# Patient Record
Sex: FEMALE | Race: BLACK OR AFRICAN AMERICAN | NOT HISPANIC OR LATINO
[De-identification: names, ages, dates, MRNs, and addresses within clinical notes are randomized per-mention and may not be internally consistent; named-entity substitution may affect disease eponyms.]

---

## 2018-05-30 PROBLEM — Z00.00 ENCOUNTER FOR PREVENTIVE HEALTH EXAMINATION: Status: ACTIVE | Noted: 2018-05-30

## 2018-06-05 ENCOUNTER — APPOINTMENT (OUTPATIENT)
Dept: OBGYN | Facility: CLINIC | Age: 23
End: 2018-06-05
Payer: COMMERCIAL

## 2018-06-05 VITALS
HEIGHT: 67 IN | SYSTOLIC BLOOD PRESSURE: 110 MMHG | BODY MASS INDEX: 24.33 KG/M2 | WEIGHT: 155 LBS | DIASTOLIC BLOOD PRESSURE: 80 MMHG

## 2018-06-05 DIAGNOSIS — Z78.9 OTHER SPECIFIED HEALTH STATUS: ICD-10-CM

## 2018-06-05 DIAGNOSIS — Z82.49 FAMILY HISTORY OF ISCHEMIC HEART DISEASE AND OTHER DISEASES OF THE CIRCULATORY SYSTEM: ICD-10-CM

## 2018-06-05 PROCEDURE — 99203 OFFICE O/P NEW LOW 30 MIN: CPT

## 2018-06-06 PROBLEM — Z82.49 FAMILY HISTORY OF HYPERTENSION: Status: ACTIVE | Noted: 2018-06-06

## 2018-06-06 PROBLEM — Z78.9 DOES NOT USE ILLICIT DRUGS: Status: ACTIVE | Noted: 2018-06-06

## 2018-06-06 PROBLEM — Z78.9 NON-SMOKER: Status: ACTIVE | Noted: 2018-06-06

## 2018-06-06 PROBLEM — Z78.9 NO HISTORY OF ALCOHOL USE: Status: ACTIVE | Noted: 2018-06-06

## 2018-06-26 ENCOUNTER — APPOINTMENT (OUTPATIENT)
Dept: OBGYN | Facility: CLINIC | Age: 23
End: 2018-06-26
Payer: COMMERCIAL

## 2018-06-26 VITALS
SYSTOLIC BLOOD PRESSURE: 112 MMHG | WEIGHT: 160 LBS | BODY MASS INDEX: 25.11 KG/M2 | DIASTOLIC BLOOD PRESSURE: 56 MMHG | HEIGHT: 67 IN

## 2018-06-26 DIAGNOSIS — N83.209 UNSPECIFIED OVARIAN CYST, UNSPECIFIED SIDE: ICD-10-CM

## 2018-06-26 DIAGNOSIS — D25.9 LEIOMYOMA OF UTERUS, UNSPECIFIED: ICD-10-CM

## 2018-06-26 PROCEDURE — 99214 OFFICE O/P EST MOD 30 MIN: CPT

## 2018-06-28 PROBLEM — N83.209 OVARIAN CYST: Status: ACTIVE | Noted: 2018-06-06

## 2018-06-28 PROBLEM — D25.9 UTERINE FIBROID: Status: ACTIVE | Noted: 2018-06-06

## 2019-07-16 ENCOUNTER — TRANSCRIPTION ENCOUNTER (OUTPATIENT)
Age: 24
End: 2019-07-16

## 2019-07-22 ENCOUNTER — TRANSCRIPTION ENCOUNTER (OUTPATIENT)
Age: 24
End: 2019-07-22

## 2019-11-21 ENCOUNTER — EMERGENCY (EMERGENCY)
Facility: HOSPITAL | Age: 24
LOS: 0 days | Discharge: HOME | End: 2019-11-21
Attending: EMERGENCY MEDICINE
Payer: COMMERCIAL

## 2019-11-21 VITALS — DIASTOLIC BLOOD PRESSURE: 69 MMHG | SYSTOLIC BLOOD PRESSURE: 121 MMHG | HEART RATE: 70 BPM

## 2019-11-21 VITALS — WEIGHT: 154.98 LBS | HEIGHT: 66 IN

## 2019-11-21 DIAGNOSIS — M54.2 CERVICALGIA: ICD-10-CM

## 2019-11-21 DIAGNOSIS — R22.41 LOCALIZED SWELLING, MASS AND LUMP, RIGHT LOWER LIMB: ICD-10-CM

## 2019-11-21 DIAGNOSIS — M43.6 TORTICOLLIS: ICD-10-CM

## 2019-11-21 LAB
ALBUMIN SERPL ELPH-MCNC: 3.8 G/DL — SIGNIFICANT CHANGE UP (ref 3.3–5)
ALP SERPL-CCNC: 58 U/L — SIGNIFICANT CHANGE UP (ref 40–120)
ALT FLD-CCNC: 17 U/L — SIGNIFICANT CHANGE UP (ref 12–78)
ANION GAP SERPL CALC-SCNC: 5 MMOL/L — SIGNIFICANT CHANGE UP (ref 5–17)
APTT BLD: 31 SEC — SIGNIFICANT CHANGE UP (ref 27.5–36.3)
AST SERPL-CCNC: 9 U/L — LOW (ref 15–37)
BASOPHILS # BLD AUTO: 0.04 K/UL — SIGNIFICANT CHANGE UP (ref 0–0.2)
BASOPHILS NFR BLD AUTO: 0.5 % — SIGNIFICANT CHANGE UP (ref 0–2)
BILIRUB SERPL-MCNC: 0.4 MG/DL — SIGNIFICANT CHANGE UP (ref 0.2–1.2)
BUN SERPL-MCNC: 10 MG/DL — SIGNIFICANT CHANGE UP (ref 7–23)
CALCIUM SERPL-MCNC: 8.9 MG/DL — SIGNIFICANT CHANGE UP (ref 8.5–10.1)
CHLORIDE SERPL-SCNC: 108 MMOL/L — SIGNIFICANT CHANGE UP (ref 96–108)
CO2 SERPL-SCNC: 27 MMOL/L — SIGNIFICANT CHANGE UP (ref 22–31)
CREAT SERPL-MCNC: 0.76 MG/DL — SIGNIFICANT CHANGE UP (ref 0.5–1.3)
D DIMER BLD IA.RAPID-MCNC: 163 NG/ML DDU — SIGNIFICANT CHANGE UP
EOSINOPHIL # BLD AUTO: 0.2 K/UL — SIGNIFICANT CHANGE UP (ref 0–0.5)
EOSINOPHIL NFR BLD AUTO: 2.7 % — SIGNIFICANT CHANGE UP (ref 0–6)
GLUCOSE SERPL-MCNC: 86 MG/DL — SIGNIFICANT CHANGE UP (ref 70–99)
HCT VFR BLD CALC: 38.1 % — SIGNIFICANT CHANGE UP (ref 34.5–45)
HGB BLD-MCNC: 12.3 G/DL — SIGNIFICANT CHANGE UP (ref 11.5–15.5)
IMM GRANULOCYTES NFR BLD AUTO: 0.3 % — SIGNIFICANT CHANGE UP (ref 0–1.5)
INR BLD: 1.21 RATIO — HIGH (ref 0.88–1.16)
LYMPHOCYTES # BLD AUTO: 1.19 K/UL — SIGNIFICANT CHANGE UP (ref 1–3.3)
LYMPHOCYTES # BLD AUTO: 16.3 % — SIGNIFICANT CHANGE UP (ref 13–44)
MCHC RBC-ENTMCNC: 29.8 PG — SIGNIFICANT CHANGE UP (ref 27–34)
MCHC RBC-ENTMCNC: 32.3 GM/DL — SIGNIFICANT CHANGE UP (ref 32–36)
MCV RBC AUTO: 92.3 FL — SIGNIFICANT CHANGE UP (ref 80–100)
MONOCYTES # BLD AUTO: 0.88 K/UL — SIGNIFICANT CHANGE UP (ref 0–0.9)
MONOCYTES NFR BLD AUTO: 12 % — SIGNIFICANT CHANGE UP (ref 2–14)
NEUTROPHILS # BLD AUTO: 4.98 K/UL — SIGNIFICANT CHANGE UP (ref 1.8–7.4)
NEUTROPHILS NFR BLD AUTO: 68.2 % — SIGNIFICANT CHANGE UP (ref 43–77)
PLATELET # BLD AUTO: 285 K/UL — SIGNIFICANT CHANGE UP (ref 150–400)
POTASSIUM SERPL-MCNC: 4 MMOL/L — SIGNIFICANT CHANGE UP (ref 3.5–5.3)
POTASSIUM SERPL-SCNC: 4 MMOL/L — SIGNIFICANT CHANGE UP (ref 3.5–5.3)
PROT SERPL-MCNC: 8.2 GM/DL — SIGNIFICANT CHANGE UP (ref 6–8.3)
PROTHROM AB SERPL-ACNC: 13.5 SEC — HIGH (ref 10–12.9)
RBC # BLD: 4.13 M/UL — SIGNIFICANT CHANGE UP (ref 3.8–5.2)
RBC # FLD: 12.4 % — SIGNIFICANT CHANGE UP (ref 10.3–14.5)
SODIUM SERPL-SCNC: 140 MMOL/L — SIGNIFICANT CHANGE UP (ref 135–145)
WBC # BLD: 7.31 K/UL — SIGNIFICANT CHANGE UP (ref 3.8–10.5)
WBC # FLD AUTO: 7.31 K/UL — SIGNIFICANT CHANGE UP (ref 3.8–10.5)

## 2019-11-21 PROCEDURE — 85610 PROTHROMBIN TIME: CPT

## 2019-11-21 PROCEDURE — 99284 EMERGENCY DEPT VISIT MOD MDM: CPT | Mod: 25

## 2019-11-21 PROCEDURE — 85379 FIBRIN DEGRADATION QUANT: CPT

## 2019-11-21 PROCEDURE — 85730 THROMBOPLASTIN TIME PARTIAL: CPT

## 2019-11-21 PROCEDURE — 96374 THER/PROPH/DIAG INJ IV PUSH: CPT

## 2019-11-21 PROCEDURE — 80053 COMPREHEN METABOLIC PANEL: CPT

## 2019-11-21 PROCEDURE — 96361 HYDRATE IV INFUSION ADD-ON: CPT

## 2019-11-21 PROCEDURE — 36415 COLL VENOUS BLD VENIPUNCTURE: CPT

## 2019-11-21 PROCEDURE — 85025 COMPLETE CBC W/AUTO DIFF WBC: CPT

## 2019-11-21 PROCEDURE — 81025 URINE PREGNANCY TEST: CPT

## 2019-11-21 PROCEDURE — 99283 EMERGENCY DEPT VISIT LOW MDM: CPT

## 2019-11-21 RX ORDER — DIAZEPAM 5 MG
5 TABLET ORAL ONCE
Refills: 0 | Status: DISCONTINUED | OUTPATIENT
Start: 2019-11-21 | End: 2019-11-21

## 2019-11-21 RX ORDER — SODIUM CHLORIDE 9 MG/ML
1000 INJECTION INTRAMUSCULAR; INTRAVENOUS; SUBCUTANEOUS ONCE
Refills: 0 | Status: COMPLETED | OUTPATIENT
Start: 2019-11-21 | End: 2019-11-21

## 2019-11-21 RX ORDER — LIDOCAINE 4 G/100G
1 CREAM TOPICAL ONCE
Refills: 0 | Status: COMPLETED | OUTPATIENT
Start: 2019-11-21 | End: 2019-11-21

## 2019-11-21 RX ORDER — KETOROLAC TROMETHAMINE 30 MG/ML
15 SYRINGE (ML) INJECTION ONCE
Refills: 0 | Status: DISCONTINUED | OUTPATIENT
Start: 2019-11-21 | End: 2019-11-21

## 2019-11-21 RX ADMIN — Medication 5 MILLIGRAM(S): at 17:03

## 2019-11-21 RX ADMIN — SODIUM CHLORIDE 1000 MILLILITER(S): 9 INJECTION INTRAMUSCULAR; INTRAVENOUS; SUBCUTANEOUS at 17:03

## 2019-11-21 RX ADMIN — Medication 15 MILLIGRAM(S): at 17:30

## 2019-11-21 RX ADMIN — Medication 15 MILLIGRAM(S): at 17:03

## 2019-11-21 RX ADMIN — SODIUM CHLORIDE 1000 MILLILITER(S): 9 INJECTION INTRAMUSCULAR; INTRAVENOUS; SUBCUTANEOUS at 18:00

## 2019-11-21 RX ADMIN — LIDOCAINE 1 PATCH: 4 CREAM TOPICAL at 17:48

## 2019-11-21 NOTE — ED STATDOCS - OBJECTIVE STATEMENT
25 y/o female with no pertinent PMHx presents to the ED c/o right sided neck pain and stiffness since 1600 yesterday. Pt felt onset of the pain at work yesterday during her night shift, and used heating pads and took Tylenol last night. Had difficulty sleeping due to the pain. States that when the pain is the worst, she has difficulty breathing, moving. Took her sister's prescribed Robaxin 750mg and Motrin 800mg as her sister was feeling similar pain a few days ago. Also noticed right ankle swelling x1 month. Denies fever, chills, sweats, nausea, vomiting, ear pain, tooth pain. Last took Motrin at 10AM today. No recent lifting, trauma, fall, injury. No daily medications. No hx of surgeries. Non smoker. No vaping. +occasional EtOH. LNMP about 3 weeks ago. NKDA.

## 2019-11-21 NOTE — ED STATDOCS - PATIENT PORTAL LINK FT
You can access the FollowMyHealth Patient Portal offered by [facility] by registering at the following website: [email or web address]. By joining [employer]’s FollowMyHealth portal, you will also be able to view your health information using other applications (apps) compatible with our system.

## 2019-11-21 NOTE — ED ADULT NURSE NOTE - OBJECTIVE STATEMENT
Patient comes to ED for stiff neck that began yesterday. Patient reports after working her night shift, tried to sleep and was unable due to pain in the neck. Patient tried taking sisters muscle relaxer with no relief.

## 2019-11-21 NOTE — ED ADULT NURSE NOTE - NSIMPLEMENTINTERV_GEN_ALL_ED
Implemented All Universal Safety Interventions:  [place] to call system. Call bell, personal items and telephone within reach. Instruct patient to call for assistance. Room bathroom lighting operational. Non-slip footwear when patient is off stretcher. Physically safe environment: no spills, clutter or unnecessary equipment. Stretcher in lowest position, wheels locked, appropriate side rails in place.

## 2019-11-21 NOTE — ED STATDOCS - NSFOLLOWUPINSTRUCTIONS_ED_ALL_ED_FT
Acute Torticollis, Adult  Torticollis is a condition in which the muscles of the neck tighten (contract) abnormally, causing the neck to twist and the head to move into an unnatural position. Torticollis that develops suddenly is called acute torticollis. People with acute torticollis may have trouble turning their head. The condition can be painful and may range from mild to severe.  What are the causes?  This condition may be caused by:  Sleeping in an awkward position (common).Extending or twisting the neck muscles beyond their normal position.An injury to the neck muscles.An infection.A tumor.Certain medicines.Long-lasting spasms of the neck muscles.In some cases, the cause may not be known.  What increases the risk?  You are more likely to develop this condition if:  You have a condition associated with loose ligaments, such as Down syndrome.You have a brain condition that affects vision, such as strabismus.What are the signs or symptoms?  The main symptom of this condition is tilting of the head to one side. Other symptoms include:  Pain in the neck.Trouble turning the head from side to side or up and down.How is this diagnosed?  This condition may be diagnosed based on:  A physical exam.Your medical history.Imaging tests, such as:  An X-ray.An ultrasound.A CT scan.An MRI.How is this treated?  Treatment for this condition depends on what is causing the condition. Mild cases may go away without treatment. Treatment for more serious cases may include:  Medicines or shots to relax the muscles.Other medicines, such as antibiotics to treat the underlying cause.Wearing a soft neck collar.Physical therapy and stretching to improve neck strength and flexibility.Neck massage.In severe cases, surgery may be needed to repair dislocated or broken bones or to treat nerves in the neck.  Follow these instructions at home:     Take over-the-counter and prescription medicines only as told by your health care provider.Do stretching exercises and massage your neck as told by your health care provider.If directed, apply heat to the affected area as often as told by your health care provider. Use the heat source that your health care provider recommends, such as a moist heat pack or a heating pad.  Place a towel between your skin and the heat source.Leave the heat on for 20–30 minutes.Remove the heat if your skin turns bright red. This is especially important if you are unable to feel pain, heat, or cold. You may have a greater risk of getting burned.If you wake up with torticollis after sleeping, check your bed or sleeping area. Look for lumpy pillows or unusual objects. Make sure your bed and sleeping area are comfortable.Keep all follow-up visits as told by your health care provider. This is important.Contact a health care provider if:  You have a fever.Your symptoms do not improve or they get worse.Get help right away if:  You have trouble breathing.You develop noisy breathing (stridor).You start to drool.You have trouble swallowing or pain when swallowing.You develop numbness or weakness in your hands or feet.You have changes in your speech, understanding, or vision.You are in severe pain.You cannot move your head or neck.Summary  Torticollis is a condition in which the muscles of the neck tighten (contract) abnormally, causing the neck to twist and the head to move into an unnatural position. Torticollis that develops suddenly is called acute torticollis.Treatment for this condition depends on what is causing the condition. Mild cases may go away without treatment.Do stretching exercises and massage your neck as told by your health care provider. You may also be instructed to apply heat to the area.Contact your health care provider if your symptoms do not improve or they get worse.This information is not intended to replace advice given to you by your health care provider. Make sure you discuss any questions you have with your health care provider.    Document Released: 12/15/2001 Document Revised: 02/15/2018 Document Reviewed: 02/15/2018  [email or web address] Interactive Patient Education © 2019 [employer]

## 2019-11-21 NOTE — ED STATDOCS - MUSCULOSKELETAL, MLM
range of motion is not limited +right sided cervical paraspinal spasm +trace right ankle edema without TTP or erythema

## 2019-11-21 NOTE — ED STATDOCS - PROGRESS NOTE DETAILS
23 y/o Female presents to ED c/o right side neck pain for 2 days.  F/U labs, re-eval s/p pain meds.  Laila Guillory PA-C 25 y/o Female presents to ED c/o right side neck pain for 2 days.  Denies trauma, falls.  No prior incidents.  Pains to Right side of neck.   On exam, holding head very stiff.  Moving whole body to turn head.  Tender R neck muscles.  Tms tobias ygrey.   Oropharynx pink.  NT teeth.  NT sinuses.  CTA B.  F/U labs, re-eval s/p pain meds.  Laila Guillory PA-C On re-eval, pt with some improvement in movement of head.  Reports pain has decreased a bit.  Still present with attempts to turn.  Neg neuro deficits to UE.  Will dc home.  F?U with PMD.  Laila Guillory PA-C

## 2019-11-21 NOTE — ED STATDOCS - CLINICAL SUMMARY MEDICAL DECISION MAKING FREE TEXT BOX
23 y/o female presents with right sided neck pain since last night. Plan: labs, muscle relaxer, pain medications, reevaluation.

## 2019-12-10 ENCOUNTER — APPOINTMENT (OUTPATIENT)
Dept: OBGYN | Facility: CLINIC | Age: 24
End: 2019-12-10
Payer: COMMERCIAL

## 2019-12-10 VITALS
DIASTOLIC BLOOD PRESSURE: 66 MMHG | HEIGHT: 67 IN | SYSTOLIC BLOOD PRESSURE: 114 MMHG | BODY MASS INDEX: 26.37 KG/M2 | WEIGHT: 168 LBS

## 2019-12-10 DIAGNOSIS — R10.2 PELVIC AND PERINEAL PAIN: ICD-10-CM

## 2019-12-10 PROCEDURE — 99213 OFFICE O/P EST LOW 20 MIN: CPT

## 2019-12-10 RX ORDER — LEVONORGESTREL 19.5 MG/1
19.5 INTRAUTERINE DEVICE INTRAUTERINE
Refills: 0 | Status: ACTIVE | COMMUNITY
Start: 2019-12-10

## 2019-12-10 NOTE — PHYSICAL EXAM
[Normal] : clitoris [Labia Majora] : labia major [Labia Minora] : labia minora [No Bleeding] : there was no active vaginal bleeding [IUD String] : had an IUD string protruding out [Normal Position] : in a normal position [Uterine Adnexae] : were not tender and not enlarged [Motion Tenderness] : there was no cervical motion tenderness [Tenderness] : nontender [Enlarged ___ wks] : not enlarged [Mass ___ cm] : no uterine mass was palpated [Adnexa Tenderness] : were not tender [Ovarian Mass (___ Cm)] : there were no adnexal masses [External Hemorrhoid] : no external hemorrhoids

## 2019-12-25 PROBLEM — R10.2 PELVIC PAIN IN FEMALE: Status: ACTIVE | Noted: 2018-06-06

## 2023-02-22 ENCOUNTER — RESULT REVIEW (OUTPATIENT)
Age: 28
End: 2023-02-22

## 2023-07-07 ENCOUNTER — EMERGENCY (EMERGENCY)
Facility: HOSPITAL | Age: 28
LOS: 0 days | Discharge: HOME | End: 2023-07-07
Attending: STUDENT IN AN ORGANIZED HEALTH CARE EDUCATION/TRAINING PROGRAM
Payer: COMMERCIAL

## 2023-07-07 VITALS — WEIGHT: 160.06 LBS | HEIGHT: 66 IN

## 2023-07-07 VITALS
SYSTOLIC BLOOD PRESSURE: 125 MMHG | HEART RATE: 66 BPM | OXYGEN SATURATION: 99 % | DIASTOLIC BLOOD PRESSURE: 73 MMHG | TEMPERATURE: 98 F | RESPIRATION RATE: 18 BRPM

## 2023-07-07 DIAGNOSIS — R60.0 LOCALIZED EDEMA: ICD-10-CM
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
## 2023-07-07 DIAGNOSIS — M54.89 OTHER DORSALGIA: ICD-10-CM

## 2023-07-07 DIAGNOSIS — R07.9 CHEST PAIN, UNSPECIFIED: ICD-10-CM

## 2023-07-07 DIAGNOSIS — S29.012A STRAIN OF MUSCLE AND TENDON OF BACK WALL OF THORAX, INITIAL ENCOUNTER: ICD-10-CM

## 2023-07-07 LAB
ALBUMIN SERPL ELPH-MCNC: 3.7 G/DL — SIGNIFICANT CHANGE UP (ref 3.3–5)
ALP SERPL-CCNC: 54 U/L — SIGNIFICANT CHANGE UP (ref 40–120)
ALT FLD-CCNC: 19 U/L — SIGNIFICANT CHANGE UP (ref 12–78)
ANION GAP SERPL CALC-SCNC: 3 MMOL/L — LOW (ref 5–17)
APTT BLD: 30.1 SEC — SIGNIFICANT CHANGE UP (ref 27.5–35.5)
AST SERPL-CCNC: 12 U/L — LOW (ref 15–37)
BASOPHILS # BLD AUTO: 0.02 K/UL — SIGNIFICANT CHANGE UP (ref 0–0.2)
BASOPHILS NFR BLD AUTO: 0.4 % — SIGNIFICANT CHANGE UP (ref 0–2)
BILIRUB SERPL-MCNC: 0.4 MG/DL — SIGNIFICANT CHANGE UP (ref 0.2–1.2)
BUN SERPL-MCNC: 12 MG/DL — SIGNIFICANT CHANGE UP (ref 7–23)
CALCIUM SERPL-MCNC: 8.9 MG/DL — SIGNIFICANT CHANGE UP (ref 8.5–10.1)
CHLORIDE SERPL-SCNC: 108 MMOL/L — SIGNIFICANT CHANGE UP (ref 96–108)
CO2 SERPL-SCNC: 28 MMOL/L — SIGNIFICANT CHANGE UP (ref 22–31)
CREAT SERPL-MCNC: 0.77 MG/DL — SIGNIFICANT CHANGE UP (ref 0.5–1.3)
D DIMER BLD IA.RAPID-MCNC: <150 NG/ML DDU — SIGNIFICANT CHANGE UP
EGFR: 107 ML/MIN/1.73M2 — SIGNIFICANT CHANGE UP
EOSINOPHIL # BLD AUTO: 0.13 K/UL — SIGNIFICANT CHANGE UP (ref 0–0.5)
EOSINOPHIL NFR BLD AUTO: 2.7 % — SIGNIFICANT CHANGE UP (ref 0–6)
GLUCOSE SERPL-MCNC: 92 MG/DL — SIGNIFICANT CHANGE UP (ref 70–99)
HCT VFR BLD CALC: 38.8 % — SIGNIFICANT CHANGE UP (ref 34.5–45)
HGB BLD-MCNC: 12.8 G/DL — SIGNIFICANT CHANGE UP (ref 11.5–15.5)
IMM GRANULOCYTES NFR BLD AUTO: 0.2 % — SIGNIFICANT CHANGE UP (ref 0–0.9)
INR BLD: 1.24 RATIO — HIGH (ref 0.88–1.16)
LIDOCAIN IGE QN: 110 U/L — SIGNIFICANT CHANGE UP (ref 73–393)
LYMPHOCYTES # BLD AUTO: 1.01 K/UL — SIGNIFICANT CHANGE UP (ref 1–3.3)
LYMPHOCYTES # BLD AUTO: 20.7 % — SIGNIFICANT CHANGE UP (ref 13–44)
MCHC RBC-ENTMCNC: 30.3 PG — SIGNIFICANT CHANGE UP (ref 27–34)
MCHC RBC-ENTMCNC: 33 GM/DL — SIGNIFICANT CHANGE UP (ref 32–36)
MCV RBC AUTO: 91.9 FL — SIGNIFICANT CHANGE UP (ref 80–100)
MONOCYTES # BLD AUTO: 0.43 K/UL — SIGNIFICANT CHANGE UP (ref 0–0.9)
MONOCYTES NFR BLD AUTO: 8.8 % — SIGNIFICANT CHANGE UP (ref 2–14)
NEUTROPHILS # BLD AUTO: 3.28 K/UL — SIGNIFICANT CHANGE UP (ref 1.8–7.4)
NEUTROPHILS NFR BLD AUTO: 67.2 % — SIGNIFICANT CHANGE UP (ref 43–77)
PLATELET # BLD AUTO: 257 K/UL — SIGNIFICANT CHANGE UP (ref 150–400)
POTASSIUM SERPL-MCNC: 4.1 MMOL/L — SIGNIFICANT CHANGE UP (ref 3.5–5.3)
POTASSIUM SERPL-SCNC: 4.1 MMOL/L — SIGNIFICANT CHANGE UP (ref 3.5–5.3)
PROT SERPL-MCNC: 8.1 GM/DL — SIGNIFICANT CHANGE UP (ref 6–8.3)
PROTHROM AB SERPL-ACNC: 14.4 SEC — HIGH (ref 10.5–13.4)
RBC # BLD: 4.22 M/UL — SIGNIFICANT CHANGE UP (ref 3.8–5.2)
RBC # FLD: 12.8 % — SIGNIFICANT CHANGE UP (ref 10.3–14.5)
SODIUM SERPL-SCNC: 139 MMOL/L — SIGNIFICANT CHANGE UP (ref 135–145)
TROPONIN I, HIGH SENSITIVITY RESULT: <3 NG/L — SIGNIFICANT CHANGE UP
WBC # BLD: 4.88 K/UL — SIGNIFICANT CHANGE UP (ref 3.8–10.5)
WBC # FLD AUTO: 4.88 K/UL — SIGNIFICANT CHANGE UP (ref 3.8–10.5)

## 2023-07-07 PROCEDURE — 85610 PROTHROMBIN TIME: CPT

## 2023-07-07 PROCEDURE — 71045 X-RAY EXAM CHEST 1 VIEW: CPT | Mod: 26

## 2023-07-07 PROCEDURE — 36415 COLL VENOUS BLD VENIPUNCTURE: CPT

## 2023-07-07 PROCEDURE — 85379 FIBRIN DEGRADATION QUANT: CPT

## 2023-07-07 PROCEDURE — 99285 EMERGENCY DEPT VISIT HI MDM: CPT

## 2023-07-07 PROCEDURE — 80053 COMPREHEN METABOLIC PANEL: CPT

## 2023-07-07 PROCEDURE — 96374 THER/PROPH/DIAG INJ IV PUSH: CPT

## 2023-07-07 PROCEDURE — 85025 COMPLETE CBC W/AUTO DIFF WBC: CPT

## 2023-07-07 PROCEDURE — 85730 THROMBOPLASTIN TIME PARTIAL: CPT

## 2023-07-07 PROCEDURE — 83690 ASSAY OF LIPASE: CPT

## 2023-07-07 PROCEDURE — 93010 ELECTROCARDIOGRAM REPORT: CPT

## 2023-07-07 PROCEDURE — 84484 ASSAY OF TROPONIN QUANT: CPT

## 2023-07-07 PROCEDURE — 71045 X-RAY EXAM CHEST 1 VIEW: CPT

## 2023-07-07 PROCEDURE — 99285 EMERGENCY DEPT VISIT HI MDM: CPT | Mod: 25

## 2023-07-07 PROCEDURE — 93005 ELECTROCARDIOGRAM TRACING: CPT

## 2023-07-07 RX ORDER — KETOROLAC TROMETHAMINE 30 MG/ML
30 SYRINGE (ML) INJECTION ONCE
Refills: 0 | Status: DISCONTINUED | OUTPATIENT
Start: 2023-07-07 | End: 2023-07-07

## 2023-07-07 RX ORDER — IBUPROFEN 200 MG
1 TABLET ORAL
Qty: 28 | Refills: 0
Start: 2023-07-07 | End: 2023-07-13

## 2023-07-07 RX ORDER — CYCLOBENZAPRINE HYDROCHLORIDE 10 MG/1
1 TABLET, FILM COATED ORAL
Qty: 21 | Refills: 0
Start: 2023-07-07 | End: 2023-07-13

## 2023-07-07 RX ADMIN — Medication 30 MILLIGRAM(S): at 12:09

## 2023-07-07 NOTE — ED STATDOCS - CLINICAL SUMMARY MEDICAL DECISION MAKING FREE TEXT BOX
27 y/o female with back pain. Screening EKG, CXR, labs including 1 troponin and d-dimer, and reevaluate.

## 2023-07-07 NOTE — ED STATDOCS - PROGRESS NOTE DETAILS
29 y/o Female presents to ED c/o severe back pain, that feels like it is in her chest too.  Woke up with pain 2 days ago and gradually increased to severe pain last night.  Denies trauma, falls, prior injuries.  Neg rash.  CTA B. RRR.  Will f/u Labs, CXR and re-eval s/p pain meds.  Laila Guillory PA-C On re-eval, s/p Toradol and heat applications, pt reports pain improved.  WBC not elevated. Hgb/Hct WNL.  Trop not elevated.  D-dimer < 150.  CXR with no abnormalities.  Lipase pending.  Plan to dc home with Nsaids and Flexeril.   Cont heat application and stretching.  Avoid heavy lifting.  Laila Guillory PA-C Lipase 110.  Will dc home.  F/u with PMD.  Laila Guillory PA-C

## 2023-07-07 NOTE — ED STATDOCS - CARE PLAN
1 Principal Discharge DX:	Muscle strain of left upper back, initial encounter  Secondary Diagnosis:	Left-sided back pain

## 2023-07-07 NOTE — ED ADULT NURSE NOTE - AS PAIN REST
"---------------------  From: Effie Mustafa RN   To: Humberto Arroyo MD;     Cc: Alexandre العلي MD; Bader, Pharm D , Alicia;      Alexandre العلي MD   To: Effie Mustafa RN;     Sent: 5/3/2021 10:53:30 AM CDT  Subject: RE: FYI: Elevated BP     Agree with advice below---------------------  From: Humberto Arroyo MD   To: Effie Mustafa RN;     Cc: Alexandre العلي MD; Bader, Pharm D , Alicia;      Sent: 5/3/2021 3:11:18 PM CDT  Subject: RE: FYI: Elevated BP     Agreed  " 5 (moderate pain)

## 2023-07-07 NOTE — ED ADULT NURSE NOTE - OBJECTIVE STATEMENT
Patient presents to ED for left back with chest pain for 2 days. Pt also c/o ankle swelling, no trauma. Denies fever/chills, headache, SOB.

## 2023-07-07 NOTE — ED STATDOCS - ATTENDING APP SHARED VISIT CONTRIBUTION OF CARE
I, Vilma Jarrett DO,  performed the initial face to face bedside interview with this patient regarding history of present illness, review of symptoms and relevant past medical, social and family history.  I completed an independent physical examination.  I was the initial provider who evaluated this patient.   I personally saw the patient and performed a substantive portion of the visit including all aspects of the medical decision making.  I have signed out the follow up of any pending tests (i.e. labs, radiological studies) to the ACP.  I have communicated the patient’s plan of care and disposition with the ACP.  The history, relevant review of systems, past medical and surgical history, medical decision making, and physical examination was documented by the scribe in my presence and I attest to the accuracy of the documentation.

## 2023-07-07 NOTE — ED STATDOCS - OBJECTIVE STATEMENT
29 y/o female with no pertinent PMHx presents to the ED c/o intermittent, sudden onset left-sided back pain perceived as chest pain x2 days, worse yesterday. Patient states pain is exacerbated when lying supine. Has been taking Tylenol for pain. Notes recent ankle edema. NKDA. Denies pregnancy, recent heavy lifting or trauma. Denies history of similar.

## 2023-07-07 NOTE — ED STATDOCS - PATIENT PORTAL LINK FT
You can access the FollowMyHealth Patient Portal offered by [facility] by registering at the following website: [email or web address]. By joining [employer]’s FollowMyHealth portal, you will also be able to view your health information using other applications (apps) compatible with our system.

## 2023-11-03 ENCOUNTER — EMERGENCY (EMERGENCY)
Facility: HOSPITAL | Age: 28
LOS: 0 days | Discharge: HOME | End: 2023-11-03
Attending: EMERGENCY MEDICINE
Payer: COMMERCIAL

## 2023-11-03 VITALS
OXYGEN SATURATION: 100 % | HEART RATE: 77 BPM | SYSTOLIC BLOOD PRESSURE: 112 MMHG | DIASTOLIC BLOOD PRESSURE: 63 MMHG | TEMPERATURE: 99 F | RESPIRATION RATE: 18 BRPM

## 2023-11-03 VITALS — HEIGHT: 66 IN | WEIGHT: 158.07 LBS

## 2023-11-03 DIAGNOSIS — O21.9 VOMITING OF PREGNANCY, UNSPECIFIED: ICD-10-CM

## 2023-11-03 DIAGNOSIS — O46.91 ANTEPARTUM HEMORRHAGE, UNSPECIFIED, FIRST TRIMESTER: ICD-10-CM

## 2023-11-03 DIAGNOSIS — Z3A.08 8 WEEKS GESTATION OF PREGNANCY: ICD-10-CM

## 2023-11-03 DIAGNOSIS — D25.9 LEIOMYOMA OF UTERUS, UNSPECIFIED: ICD-10-CM

## 2023-11-03 DIAGNOSIS — O99.891 OTHER SPECIFIED DISEASES AND CONDITIONS COMPLICATING PREGNANCY: ICD-10-CM

## 2023-11-03 LAB
ALBUMIN SERPL ELPH-MCNC: 3.4 G/DL — SIGNIFICANT CHANGE UP (ref 3.3–5)
ALBUMIN SERPL ELPH-MCNC: 3.4 G/DL — SIGNIFICANT CHANGE UP (ref 3.3–5)
ALP SERPL-CCNC: 54 U/L — SIGNIFICANT CHANGE UP (ref 40–120)
ALP SERPL-CCNC: 54 U/L — SIGNIFICANT CHANGE UP (ref 40–120)
ALT FLD-CCNC: 23 U/L — SIGNIFICANT CHANGE UP (ref 12–78)
ALT FLD-CCNC: 23 U/L — SIGNIFICANT CHANGE UP (ref 12–78)
ANION GAP SERPL CALC-SCNC: 8 MMOL/L — SIGNIFICANT CHANGE UP (ref 5–17)
ANION GAP SERPL CALC-SCNC: 8 MMOL/L — SIGNIFICANT CHANGE UP (ref 5–17)
APPEARANCE UR: ABNORMAL
APPEARANCE UR: ABNORMAL
AST SERPL-CCNC: 14 U/L — LOW (ref 15–37)
AST SERPL-CCNC: 14 U/L — LOW (ref 15–37)
BACTERIA # UR AUTO: ABNORMAL /HPF
BACTERIA # UR AUTO: ABNORMAL /HPF
BASOPHILS # BLD AUTO: 0.04 K/UL — SIGNIFICANT CHANGE UP (ref 0–0.2)
BASOPHILS # BLD AUTO: 0.04 K/UL — SIGNIFICANT CHANGE UP (ref 0–0.2)
BASOPHILS NFR BLD AUTO: 0.5 % — SIGNIFICANT CHANGE UP (ref 0–2)
BASOPHILS NFR BLD AUTO: 0.5 % — SIGNIFICANT CHANGE UP (ref 0–2)
BILIRUB SERPL-MCNC: 0.5 MG/DL — SIGNIFICANT CHANGE UP (ref 0.2–1.2)
BILIRUB SERPL-MCNC: 0.5 MG/DL — SIGNIFICANT CHANGE UP (ref 0.2–1.2)
BILIRUB UR-MCNC: NEGATIVE — SIGNIFICANT CHANGE UP
BILIRUB UR-MCNC: NEGATIVE — SIGNIFICANT CHANGE UP
BUN SERPL-MCNC: 8 MG/DL — SIGNIFICANT CHANGE UP (ref 7–23)
BUN SERPL-MCNC: 8 MG/DL — SIGNIFICANT CHANGE UP (ref 7–23)
CALCIUM SERPL-MCNC: 9.3 MG/DL — SIGNIFICANT CHANGE UP (ref 8.5–10.1)
CALCIUM SERPL-MCNC: 9.3 MG/DL — SIGNIFICANT CHANGE UP (ref 8.5–10.1)
CAST: SIGNIFICANT CHANGE UP /LPF
CAST: SIGNIFICANT CHANGE UP /LPF
CHLORIDE SERPL-SCNC: 106 MMOL/L — SIGNIFICANT CHANGE UP (ref 96–108)
CHLORIDE SERPL-SCNC: 106 MMOL/L — SIGNIFICANT CHANGE UP (ref 96–108)
CO2 SERPL-SCNC: 24 MMOL/L — SIGNIFICANT CHANGE UP (ref 22–31)
CO2 SERPL-SCNC: 24 MMOL/L — SIGNIFICANT CHANGE UP (ref 22–31)
COLOR SPEC: SIGNIFICANT CHANGE UP
COLOR SPEC: SIGNIFICANT CHANGE UP
CREAT SERPL-MCNC: 0.74 MG/DL — SIGNIFICANT CHANGE UP (ref 0.5–1.3)
CREAT SERPL-MCNC: 0.74 MG/DL — SIGNIFICANT CHANGE UP (ref 0.5–1.3)
DIFF PNL FLD: NEGATIVE — SIGNIFICANT CHANGE UP
DIFF PNL FLD: NEGATIVE — SIGNIFICANT CHANGE UP
EGFR: 112 ML/MIN/1.73M2 — SIGNIFICANT CHANGE UP
EGFR: 112 ML/MIN/1.73M2 — SIGNIFICANT CHANGE UP
EOSINOPHIL # BLD AUTO: 0.08 K/UL — SIGNIFICANT CHANGE UP (ref 0–0.5)
EOSINOPHIL # BLD AUTO: 0.08 K/UL — SIGNIFICANT CHANGE UP (ref 0–0.5)
EOSINOPHIL NFR BLD AUTO: 1.1 % — SIGNIFICANT CHANGE UP (ref 0–6)
EOSINOPHIL NFR BLD AUTO: 1.1 % — SIGNIFICANT CHANGE UP (ref 0–6)
GLUCOSE SERPL-MCNC: 91 MG/DL — SIGNIFICANT CHANGE UP (ref 70–99)
GLUCOSE SERPL-MCNC: 91 MG/DL — SIGNIFICANT CHANGE UP (ref 70–99)
GLUCOSE UR QL: NEGATIVE MG/DL — SIGNIFICANT CHANGE UP
GLUCOSE UR QL: NEGATIVE MG/DL — SIGNIFICANT CHANGE UP
HCG SERPL-ACNC: HIGH MIU/ML
HCG SERPL-ACNC: HIGH MIU/ML
HCT VFR BLD CALC: 39.5 % — SIGNIFICANT CHANGE UP (ref 34.5–45)
HCT VFR BLD CALC: 39.5 % — SIGNIFICANT CHANGE UP (ref 34.5–45)
HGB BLD-MCNC: 13.4 G/DL — SIGNIFICANT CHANGE UP (ref 11.5–15.5)
HGB BLD-MCNC: 13.4 G/DL — SIGNIFICANT CHANGE UP (ref 11.5–15.5)
IMM GRANULOCYTES NFR BLD AUTO: 0.5 % — SIGNIFICANT CHANGE UP (ref 0–0.9)
IMM GRANULOCYTES NFR BLD AUTO: 0.5 % — SIGNIFICANT CHANGE UP (ref 0–0.9)
KETONES UR-MCNC: 80 MG/DL
KETONES UR-MCNC: 80 MG/DL
LEUKOCYTE ESTERASE UR-ACNC: NEGATIVE — SIGNIFICANT CHANGE UP
LEUKOCYTE ESTERASE UR-ACNC: NEGATIVE — SIGNIFICANT CHANGE UP
LYMPHOCYTES # BLD AUTO: 0.87 K/UL — LOW (ref 1–3.3)
LYMPHOCYTES # BLD AUTO: 0.87 K/UL — LOW (ref 1–3.3)
LYMPHOCYTES # BLD AUTO: 11.8 % — LOW (ref 13–44)
LYMPHOCYTES # BLD AUTO: 11.8 % — LOW (ref 13–44)
MAGNESIUM SERPL-MCNC: 2 MG/DL — SIGNIFICANT CHANGE UP (ref 1.6–2.6)
MAGNESIUM SERPL-MCNC: 2 MG/DL — SIGNIFICANT CHANGE UP (ref 1.6–2.6)
MCHC RBC-ENTMCNC: 30.9 PG — SIGNIFICANT CHANGE UP (ref 27–34)
MCHC RBC-ENTMCNC: 30.9 PG — SIGNIFICANT CHANGE UP (ref 27–34)
MCHC RBC-ENTMCNC: 33.9 GM/DL — SIGNIFICANT CHANGE UP (ref 32–36)
MCHC RBC-ENTMCNC: 33.9 GM/DL — SIGNIFICANT CHANGE UP (ref 32–36)
MCV RBC AUTO: 91.2 FL — SIGNIFICANT CHANGE UP (ref 80–100)
MCV RBC AUTO: 91.2 FL — SIGNIFICANT CHANGE UP (ref 80–100)
MONOCYTES # BLD AUTO: 0.46 K/UL — SIGNIFICANT CHANGE UP (ref 0–0.9)
MONOCYTES # BLD AUTO: 0.46 K/UL — SIGNIFICANT CHANGE UP (ref 0–0.9)
MONOCYTES NFR BLD AUTO: 6.2 % — SIGNIFICANT CHANGE UP (ref 2–14)
MONOCYTES NFR BLD AUTO: 6.2 % — SIGNIFICANT CHANGE UP (ref 2–14)
NEUTROPHILS # BLD AUTO: 5.9 K/UL — SIGNIFICANT CHANGE UP (ref 1.8–7.4)
NEUTROPHILS # BLD AUTO: 5.9 K/UL — SIGNIFICANT CHANGE UP (ref 1.8–7.4)
NEUTROPHILS NFR BLD AUTO: 79.9 % — HIGH (ref 43–77)
NEUTROPHILS NFR BLD AUTO: 79.9 % — HIGH (ref 43–77)
NITRITE UR-MCNC: NEGATIVE — SIGNIFICANT CHANGE UP
NITRITE UR-MCNC: NEGATIVE — SIGNIFICANT CHANGE UP
PH UR: 5.5 — SIGNIFICANT CHANGE UP (ref 5–8)
PH UR: 5.5 — SIGNIFICANT CHANGE UP (ref 5–8)
PLATELET # BLD AUTO: 291 K/UL — SIGNIFICANT CHANGE UP (ref 150–400)
PLATELET # BLD AUTO: 291 K/UL — SIGNIFICANT CHANGE UP (ref 150–400)
POTASSIUM SERPL-MCNC: 4.2 MMOL/L — SIGNIFICANT CHANGE UP (ref 3.5–5.3)
POTASSIUM SERPL-MCNC: 4.2 MMOL/L — SIGNIFICANT CHANGE UP (ref 3.5–5.3)
POTASSIUM SERPL-SCNC: 4.2 MMOL/L — SIGNIFICANT CHANGE UP (ref 3.5–5.3)
POTASSIUM SERPL-SCNC: 4.2 MMOL/L — SIGNIFICANT CHANGE UP (ref 3.5–5.3)
PROT SERPL-MCNC: 8.4 GM/DL — HIGH (ref 6–8.3)
PROT SERPL-MCNC: 8.4 GM/DL — HIGH (ref 6–8.3)
PROT UR-MCNC: SIGNIFICANT CHANGE UP MG/DL
PROT UR-MCNC: SIGNIFICANT CHANGE UP MG/DL
RBC # BLD: 4.33 M/UL — SIGNIFICANT CHANGE UP (ref 3.8–5.2)
RBC # BLD: 4.33 M/UL — SIGNIFICANT CHANGE UP (ref 3.8–5.2)
RBC # FLD: 12.4 % — SIGNIFICANT CHANGE UP (ref 10.3–14.5)
RBC # FLD: 12.4 % — SIGNIFICANT CHANGE UP (ref 10.3–14.5)
RBC CASTS # UR COMP ASSIST: 3 /HPF — SIGNIFICANT CHANGE UP (ref 0–4)
RBC CASTS # UR COMP ASSIST: 3 /HPF — SIGNIFICANT CHANGE UP (ref 0–4)
SODIUM SERPL-SCNC: 138 MMOL/L — SIGNIFICANT CHANGE UP (ref 135–145)
SODIUM SERPL-SCNC: 138 MMOL/L — SIGNIFICANT CHANGE UP (ref 135–145)
SP GR SPEC: 1.03 — SIGNIFICANT CHANGE UP (ref 1–1.03)
SP GR SPEC: 1.03 — SIGNIFICANT CHANGE UP (ref 1–1.03)
SQUAMOUS # UR AUTO: >36 /HPF — HIGH (ref 0–5)
SQUAMOUS # UR AUTO: >36 /HPF — HIGH (ref 0–5)
UROBILINOGEN FLD QL: 1 MG/DL — SIGNIFICANT CHANGE UP (ref 0.2–1)
UROBILINOGEN FLD QL: 1 MG/DL — SIGNIFICANT CHANGE UP (ref 0.2–1)
WBC # BLD: 7.39 K/UL — SIGNIFICANT CHANGE UP (ref 3.8–10.5)
WBC # BLD: 7.39 K/UL — SIGNIFICANT CHANGE UP (ref 3.8–10.5)
WBC # FLD AUTO: 7.39 K/UL — SIGNIFICANT CHANGE UP (ref 3.8–10.5)
WBC # FLD AUTO: 7.39 K/UL — SIGNIFICANT CHANGE UP (ref 3.8–10.5)
WBC UR QL: 8 /HPF — HIGH (ref 0–5)
WBC UR QL: 8 /HPF — HIGH (ref 0–5)

## 2023-11-03 PROCEDURE — 76817 TRANSVAGINAL US OBSTETRIC: CPT

## 2023-11-03 PROCEDURE — 87086 URINE CULTURE/COLONY COUNT: CPT

## 2023-11-03 PROCEDURE — 84702 CHORIONIC GONADOTROPIN TEST: CPT

## 2023-11-03 PROCEDURE — 36415 COLL VENOUS BLD VENIPUNCTURE: CPT

## 2023-11-03 PROCEDURE — 85025 COMPLETE CBC W/AUTO DIFF WBC: CPT

## 2023-11-03 PROCEDURE — 80053 COMPREHEN METABOLIC PANEL: CPT

## 2023-11-03 PROCEDURE — 99284 EMERGENCY DEPT VISIT MOD MDM: CPT | Mod: 25

## 2023-11-03 PROCEDURE — 83735 ASSAY OF MAGNESIUM: CPT

## 2023-11-03 PROCEDURE — 76817 TRANSVAGINAL US OBSTETRIC: CPT | Mod: 26

## 2023-11-03 PROCEDURE — 96374 THER/PROPH/DIAG INJ IV PUSH: CPT

## 2023-11-03 PROCEDURE — 81001 URINALYSIS AUTO W/SCOPE: CPT

## 2023-11-03 PROCEDURE — 96375 TX/PRO/DX INJ NEW DRUG ADDON: CPT

## 2023-11-03 PROCEDURE — 99284 EMERGENCY DEPT VISIT MOD MDM: CPT

## 2023-11-03 RX ORDER — SODIUM CHLORIDE 9 MG/ML
1000 INJECTION INTRAMUSCULAR; INTRAVENOUS; SUBCUTANEOUS ONCE
Refills: 0 | Status: COMPLETED | OUTPATIENT
Start: 2023-11-03 | End: 2023-11-03

## 2023-11-03 RX ORDER — METOCLOPRAMIDE HCL 10 MG
5 TABLET ORAL
Qty: 75 | Refills: 0
Start: 2023-11-03 | End: 2023-11-07

## 2023-11-03 RX ORDER — DIPHENHYDRAMINE HCL 50 MG
25 CAPSULE ORAL ONCE
Refills: 0 | Status: COMPLETED | OUTPATIENT
Start: 2023-11-03 | End: 2023-11-03

## 2023-11-03 RX ORDER — METOCLOPRAMIDE HCL 10 MG
10 TABLET ORAL ONCE
Refills: 0 | Status: COMPLETED | OUTPATIENT
Start: 2023-11-03 | End: 2023-11-03

## 2023-11-03 RX ADMIN — Medication 10 MILLIGRAM(S): at 04:11

## 2023-11-03 RX ADMIN — Medication 25 MILLIGRAM(S): at 04:10

## 2023-11-03 RX ADMIN — SODIUM CHLORIDE 1000 MILLILITER(S): 9 INJECTION INTRAMUSCULAR; INTRAVENOUS; SUBCUTANEOUS at 04:10

## 2023-11-03 NOTE — ED PROVIDER NOTE - PROGRESS NOTE DETAILS
Patient already feeling much better.  US pending.  Signout given to next attending to follow up US results. An extensive discussion was had with the patient regarding labs/imaging and tests prior to discharge. We discussed in depth the importance of follow up for continuing medical care as an outpatient. The patient was advised to return the Emergency Department for worsening or persistent symptoms, and/or any new or concerning symptoms.    ultrasound shows 7.3 cm pedunculated fibroid. Subchorionic hematomas measuring   up to 0.8 cm. Short-term follow-up ultrasound suggested.

## 2023-11-03 NOTE — ED PROVIDER NOTE - PATIENT PORTAL LINK FT
You can access the FollowMyHealth Patient Portal offered by [facility] by registering at the following website: [email or web address]. By joining [employer]’s FollowMyHealth portal, you will also be able to view your health information using other applications (apps) compatible with our system.

## 2023-11-03 NOTE — ED ADULT NURSE NOTE - NSFALLUNIVINTERV_ED_ALL_ED
Bed/Stretcher in lowest position, wheels locked, appropriate side rails in place/Call bell, personal items and telephone in reach/Instruct patient to call for assistance before getting out of bed/chair/stretcher/Non-slip footwear applied when patient is off stretcher/[place] to call system/Physically safe environment - no spills, clutter or unnecessary equipment/Purposeful proactive rounding/Room/bathroom lighting operational, light cord in reach

## 2023-11-03 NOTE — ED ADULT NURSE NOTE - OBJECTIVE STATEMENT
pt is A/Ox4 from home, ambulatory, and 8 weeks pregnant. pt to the ED with c/o N/V, decreased PO intake, and feelings of " dehydration" for the past couple weeks but worsening tonight. pt respirations even and unlabored. pt in NAD. Per MD orders pt IV placed, labs sent, pt medicated and EKG completed.

## 2023-11-03 NOTE — ED ADULT TRIAGE NOTE - CHIEF COMPLAINT QUOTE
Ambulatory to ER with c/o emesis x 2 days. Patient 8 weeks pregnant first appointment 11/07/23; no medication taken prior to arrival.

## 2023-11-03 NOTE — ED PROVIDER NOTE - ENMT, MLM
Airway patent, Nasal mucosa clear. Slightly dry lips and tongue. Mouth with normal mucosa. Throat has no vesicles, no oropharyngeal exudates and uvula is midline.

## 2023-11-03 NOTE — ED PROVIDER NOTE - OBJECTIVE STATEMENT
Pt. is a 27 yo F  @ 8 wks gestation by LMP 23 presents with nausea and vomiting X 3 days.  Patient has been suffering from intermittent nausea and using adilson pops, unisom, and Vitamin B6 without relief.  States it was working for about 2 wks and now does not work.  Patient states she now cannot keep down water, food, or meds.  Patient denies abdominal pain, vaginal bleeding, discharge or cramping.  Denies fever, diarrhea, sick contacts.

## 2023-11-03 NOTE — ED PROVIDER NOTE - NSFOLLOWUPINSTRUCTIONS_ED_ALL_ED_FT
See your OB/GYN within 1 week.        NAUSEA AND VOMITING IN PREGNANCY - General Information    Nausea and Vomiting in Pregnancy    WHAT YOU NEED TO KNOW:    What do I need to know about nausea and vomiting in pregnancy? Nausea and vomiting can happen any time of day. These symptoms usually start before the 9th week of pregnancy, and end by the 14th week (second trimester). Some women can have nausea and vomiting for a longer time. These symptoms can make it hard for you to do your daily activities.    What increases my risk for nausea and vomiting in pregnancy?    Being pregnant with more than one baby    Nausea and vomiting in a past pregnancy    Having a sister or mother who had nausea and vomiting during pregnancy    History of migraine headaches or motion sickness    Being pregnant with a female baby  How is nausea and vomiting in pregnancy treated? Treatment for nausea and vomiting in pregnancy is usually not needed. You can make changes in the foods you eat and in your activities to help manage your symptoms. You may need to try several things to learn what works for you. Talk to your healthcare provider if your symptoms do not decrease with the changes suggested below.    What nutrition changes can I make to manage nausea and vomiting?    Eat smaller meals, more often. Eat a small snack, such as crackers, dry cereal, or a small sandwich before you go to bed.    Eat some crackers or dry toast before you get out of bed in the morning. Get out of bed slowly. Sudden movements could cause you to get dizzy and nauseated.    Eat bland foods when you feel nauseated. Examples of bland foods include dry toast, dry cereal, plain pasta, white rice, and bread. Other bland foods include saltine crackers, bananas, gelatin, and pretzels. Avoid spicy, greasy, and fried foods.    Drink liquids that contain ginger. Drink ginger ale made with real ginger or ginger tea made with fresh grated ginger. Ginger capsules or ginger candies may also help to decrease nausea and vomiting.    Drink liquids between meals instead of with meals. Wait at least 30 minutes after you eat to drink liquids. Drink small amounts of liquids often throughout the day to prevent dehydration. Ask how much liquid you should drink each day.  What other changes can I make to manage nausea and vomiting?    Avoid smells that bother you. Strong odors may cause nausea and vomiting to start, or make it worse.    Do not brush your teeth right after you eat if it makes you nauseated.    Rest when you need to. Start activity slowly and work up to your usual routine as you start to feel better.    Talk to your healthcare provider about your prenatal vitamins. Prenatal vitamins can cause nausea for some women. Try taking your prenatal vitamin at night or with a snack. If this change does not help, your healthcare provider may recommend a different type of vitamin.    Light to moderate exercise may help to decrease your symptoms. It may also help you to sleep better at night. Ask your healthcare provider about the best exercise plan for you.  When should I seek immediate care?    You are dizzy, cold, and thirsty, and your eyes and mouth are dry.    You are urinating very little or not at all.    You are dizzy or lightheaded when you stand up.    You see blood or material that looks like coffee grounds in your vomit.  When should I call my doctor?    You vomit more than 4 times in 1 day.    You have not been able to keep liquids down for more than 1 day.    You lose more than 2 pounds.    You have a fever.    Your nausea and vomiting continue longer than 14 weeks.    You have questions or concerns about your condition or care.  CARE AGREEMENT:    You have the right to help plan your care. Learn about your health condition and how it may be treated. Discuss treatment options with your healthcare providers to decide what care you want to receive. You always have the right to refuse treatment.    © [employer] 1973, 2023    	  back to top            © [employer] 1973, 2023 See your OB/GYN within 1 week.    Seek immediate medical assistance for any new or worsening symptoms. If you have issues obtaining follow up, please call: [phone] or [phone]  to obtain a doctor or specialist who takes your insurance in your area.      Ultrasound shows a 7.3 cm pedunculated fibroid. Subchorionic hematomas measuring   up to 0.8 cm. Short-term follow-up ultrasound suggested.        NAUSEA AND VOMITING IN PREGNANCY - General Information    Nausea and Vomiting in Pregnancy    WHAT YOU NEED TO KNOW:    What do I need to know about nausea and vomiting in pregnancy? Nausea and vomiting can happen any time of day. These symptoms usually start before the 9th week of pregnancy, and end by the 14th week (second trimester). Some women can have nausea and vomiting for a longer time. These symptoms can make it hard for you to do your daily activities.    What increases my risk for nausea and vomiting in pregnancy?    Being pregnant with more than one baby    Nausea and vomiting in a past pregnancy    Having a sister or mother who had nausea and vomiting during pregnancy    History of migraine headaches or motion sickness    Being pregnant with a female baby  How is nausea and vomiting in pregnancy treated? Treatment for nausea and vomiting in pregnancy is usually not needed. You can make changes in the foods you eat and in your activities to help manage your symptoms. You may need to try several things to learn what works for you. Talk to your healthcare provider if your symptoms do not decrease with the changes suggested below.    What nutrition changes can I make to manage nausea and vomiting?    Eat smaller meals, more often. Eat a small snack, such as crackers, dry cereal, or a small sandwich before you go to bed.    Eat some crackers or dry toast before you get out of bed in the morning. Get out of bed slowly. Sudden movements could cause you to get dizzy and nauseated.    Eat bland foods when you feel nauseated. Examples of bland foods include dry toast, dry cereal, plain pasta, white rice, and bread. Other bland foods include saltine crackers, bananas, gelatin, and pretzels. Avoid spicy, greasy, and fried foods.    Drink liquids that contain ginger. Drink ginger ale made with real ginger or ginger tea made with fresh grated ginger. Ginger capsules or ginger candies may also help to decrease nausea and vomiting.    Drink liquids between meals instead of with meals. Wait at least 30 minutes after you eat to drink liquids. Drink small amounts of liquids often throughout the day to prevent dehydration. Ask how much liquid you should drink each day.  What other changes can I make to manage nausea and vomiting?    Avoid smells that bother you. Strong odors may cause nausea and vomiting to start, or make it worse.    Do not brush your teeth right after you eat if it makes you nauseated.    Rest when you need to. Start activity slowly and work up to your usual routine as you start to feel better.    Talk to your healthcare provider about your prenatal vitamins. Prenatal vitamins can cause nausea for some women. Try taking your prenatal vitamin at night or with a snack. If this change does not help, your healthcare provider may recommend a different type of vitamin.    Light to moderate exercise may help to decrease your symptoms. It may also help you to sleep better at night. Ask your healthcare provider about the best exercise plan for you.  When should I seek immediate care?    You are dizzy, cold, and thirsty, and your eyes and mouth are dry.    You are urinating very little or not at all.    You are dizzy or lightheaded when you stand up.    You see blood or material that looks like coffee grounds in your vomit.  When should I call my doctor?    You vomit more than 4 times in 1 day.    You have not been able to keep liquids down for more than 1 day.    You lose more than 2 pounds.    You have a fever.    Your nausea and vomiting continue longer than 14 weeks.    You have questions or concerns about your condition or care.  CARE AGREEMENT:    You have the right to help plan your care. Learn about your health condition and how it may be treated. Discuss treatment options with your healthcare providers to decide what care you want to receive. You always have the right to refuse treatment.    © [employer] 1973, 2023    	  back to top            © [employer] 1973, 2023

## 2023-11-03 NOTE — ED PROVIDER NOTE - CLINICAL SUMMARY MEDICAL DECISION MAKING FREE TEXT BOX
29 yo F ,  in early 1st trimester with vomiting.  Will hydrate, give reglan and benadryl and re-assess.  Labs, urine and US ordered to confirm dates and IUP.

## 2023-11-04 LAB
CULTURE RESULTS: SIGNIFICANT CHANGE UP
CULTURE RESULTS: SIGNIFICANT CHANGE UP
SPECIMEN SOURCE: SIGNIFICANT CHANGE UP
SPECIMEN SOURCE: SIGNIFICANT CHANGE UP

## 2023-11-29 ENCOUNTER — APPOINTMENT (OUTPATIENT)
Dept: ANTEPARTUM | Facility: CLINIC | Age: 28
End: 2023-11-29

## 2023-12-07 ENCOUNTER — ASOB RESULT (OUTPATIENT)
Age: 28
End: 2023-12-07

## 2023-12-07 ENCOUNTER — APPOINTMENT (OUTPATIENT)
Dept: ANTEPARTUM | Facility: CLINIC | Age: 28
End: 2023-12-07
Payer: COMMERCIAL

## 2023-12-07 PROCEDURE — 76813 OB US NUCHAL MEAS 1 GEST: CPT | Mod: 59

## 2023-12-07 PROCEDURE — 76801 OB US < 14 WKS SINGLE FETUS: CPT

## 2024-01-04 ENCOUNTER — APPOINTMENT (OUTPATIENT)
Dept: ANTEPARTUM | Facility: CLINIC | Age: 29
End: 2024-01-04
Payer: COMMERCIAL

## 2024-01-04 ENCOUNTER — ASOB RESULT (OUTPATIENT)
Age: 29
End: 2024-01-04

## 2024-01-04 PROCEDURE — 76805 OB US >/= 14 WKS SNGL FETUS: CPT

## 2024-01-23 ENCOUNTER — APPOINTMENT (OUTPATIENT)
Dept: ANTEPARTUM | Facility: CLINIC | Age: 29
End: 2024-01-23
Payer: COMMERCIAL

## 2024-01-23 ENCOUNTER — ASOB RESULT (OUTPATIENT)
Age: 29
End: 2024-01-23

## 2024-01-23 PROCEDURE — 76811 OB US DETAILED SNGL FETUS: CPT

## 2024-02-05 ENCOUNTER — APPOINTMENT (OUTPATIENT)
Dept: ANTEPARTUM | Facility: CLINIC | Age: 29
End: 2024-02-05
Payer: COMMERCIAL

## 2024-02-05 ENCOUNTER — OUTPATIENT (OUTPATIENT)
Dept: OUTPATIENT SERVICES | Facility: HOSPITAL | Age: 29
LOS: 1 days | Discharge: HOME | End: 2024-02-05
Payer: COMMERCIAL

## 2024-02-05 VITALS
DIASTOLIC BLOOD PRESSURE: 73 MMHG | TEMPERATURE: 98 F | RESPIRATION RATE: 16 BRPM | HEART RATE: 89 BPM | SYSTOLIC BLOOD PRESSURE: 124 MMHG

## 2024-02-05 VITALS — SYSTOLIC BLOOD PRESSURE: 135 MMHG | HEART RATE: 78 BPM | DIASTOLIC BLOOD PRESSURE: 73 MMHG

## 2024-02-05 DIAGNOSIS — O26.899 OTHER SPECIFIED PREGNANCY RELATED CONDITIONS, UNSPECIFIED TRIMESTER: ICD-10-CM

## 2024-02-05 LAB
APPEARANCE UR: CLEAR — SIGNIFICANT CHANGE UP
BILIRUB UR-MCNC: NEGATIVE — SIGNIFICANT CHANGE UP
COLOR SPEC: YELLOW — SIGNIFICANT CHANGE UP
DIFF PNL FLD: NEGATIVE — SIGNIFICANT CHANGE UP
GLUCOSE UR QL: NEGATIVE MG/DL — SIGNIFICANT CHANGE UP
KETONES UR-MCNC: NEGATIVE MG/DL — SIGNIFICANT CHANGE UP
LEUKOCYTE ESTERASE UR-ACNC: NEGATIVE — SIGNIFICANT CHANGE UP
NITRITE UR-MCNC: NEGATIVE — SIGNIFICANT CHANGE UP
PH UR: 7.5 — SIGNIFICANT CHANGE UP (ref 5–8)
PROT UR-MCNC: NEGATIVE MG/DL — SIGNIFICANT CHANGE UP
SP GR SPEC: 1.02 — SIGNIFICANT CHANGE UP (ref 1–1.03)
UROBILINOGEN FLD QL: 1 MG/DL — SIGNIFICANT CHANGE UP (ref 0.2–1)

## 2024-02-05 PROCEDURE — 76815 OB US LIMITED FETUS(S): CPT | Mod: 26

## 2024-02-05 PROCEDURE — 99221 1ST HOSP IP/OBS SF/LOW 40: CPT

## 2024-02-05 NOTE — OB PROVIDER TRIAGE NOTE - NS_[facility]ON[facility]_OBGYN_ALL_OB_FT
CL 2.97-3.50cm, SEDA fibroid 7.5 x 7.4cm, MVP 5.1cm. Fetal movement seen. M mode 161. [facility]

## 2024-02-05 NOTE — OB PROVIDER TRIAGE NOTE - HISTORY OF PRESENT ILLNESS
Pt is a  at 21W 6D who presents with light pink discharge this AM. she states she noticed it on her underwear when she first woke up and wiped and noticed it upon arrival to triage in her underwear. Pt denies recent intercourse. Pt states she has had lower back cramping x 1 week. She states last week she had abdominal pain/cramping and saw doctor and was told it was constipation. She said pain resolved after bowel movement. Pt denies leaking of fluid. She states she feels fetal movement, however feels less today. Pt denies dysuria, hematuria, urinary frequency.     AP course complicated by large uterine fibroid   ATU scan on 24: SEDA intramural fibroid 7.7 x 7.5 x 7.3 cm, breech, posterior placenta no previa.

## 2024-02-05 NOTE — OB PROVIDER TRIAGE NOTE - ADDITIONAL INSTRUCTIONS
Pt states heat pack has helped improved pain  OK to be discharged home.   Advised to use heat pack/tylenol for pain.   Discussed option of indocin per Dr Yanez however patient declined as she is not in a lot of pain.   Advised patient to return to triage for worsening symptoms, contractions, leaking fluid, vaginal bleeding.   Discharged home at 10:10AM

## 2024-02-05 NOTE — OB RN TRIAGE NOTE - NS_NPO_OBGYN_ALL_OB_DT
"Physical Therapy Treatment    Patient Name:  Kristin Goodman   MRN:  [id number]    Recommendations:     Discharge Recommendations: rehabilitation facility  Discharge Equipment Recommendations: none  Barriers to discharge:  Increased level of assistance required     Assessment:     Kristin Goodman is a 74 y.o. female admitted with a medical diagnosis of Bacteremia due to Enterococcus.  She presents with the following impairments/functional limitations: weakness, impaired endurance, impaired functional mobility, gait instability, impaired balance, decreased coordination. Pt participated, and tolerated treatment well. Pt will continue to benefit from skilled PT services to improve all deficits noted above. Resume PT POC as indicated.     Rehab Prognosis: Good; patient would benefit from acute skilled PT services to address these deficits and reach maximum level of function.    Recent Surgery: * No surgery found *      Plan:     During this hospitalization, patient to be seen 3 x/week to address the identified rehab impairments via gait training, therapeutic activities, therapeutic exercises, neuromuscular re-education and progress toward the following goals:    Plan of Care Expires:  02/09/23    Subjective     "I am expected to go for a scan soon."    Pain/Comfort:  Pain Rating 1: 0/10  Pain Rating Post-Intervention 1: 0/10      Objective:     Communicated with nursing prior to session.  Patient found up in chair with  (all lines intact) upon PT entry to room.     General Precautions: Standard, fall  Orthopedic Precautions: N/A  Braces: N/A  Respiratory Status: Room air     Functional Mobility:  Transfers:  Sit to Stand: to/from bedside chair contact guard assistance with rolling walker  Gait: ~22ft with RW and SBA. Pt with decreased step length, and decreased gait speed.       AM-PAC 6 CLICK MOBILITY   Total Score: 18     Treatment & Education:  -BLE therex x8 reps: AP,LAQ,HF  -Questions answered within PTA scope of " practice.     Patient left up in chair with all lines intact, call button in reach, and nursing notified..    GOALS:   Multidisciplinary Problems       Physical Therapy Goals          Problem: Physical Therapy    Goal Priority Disciplines Outcome Goal Variances Interventions   Physical Therapy Goal     PT, PT/OT Ongoing, Progressing     Description: Goals to be met by: 23     Patient will increase functional independence with mobility by performin. Sit to stand transfer with Supervision  2. Gait  x 150 feet with Supervision using Rolling Walker.   3. Stand for 8 minutes with Supervision using Rolling Walker                         Time Tracking:     PT Received On: 23  PT Start Time: 1400     PT Stop Time: 1415  PT Total Time (min): 15 min     Billable Minutes: Gait Training 15    Treatment Type: Treatment  PT/PTA: PTA     PTA Visit Number: 1     2023   04-Feb-2024 19:00

## 2024-02-05 NOTE — OB PROVIDER TRIAGE NOTE - NS_DCGESTAGE_OBGYN_ALL_OB_FT
21.6 Benzoyl Peroxide Pregnancy And Lactation Text: This medication is Pregnancy Category C. It is unknown if benzoyl peroxide is excreted in breast milk.

## 2024-02-05 NOTE — OB PROVIDER TRIAGE NOTE - NSHPPHYSICALEXAM_GEN_ALL_CORE
Vital Signs Last 24 Hrs  T(C): 36.8 (05 Feb 2024 08:35), Max: 36.8 (05 Feb 2024 08:11)  T(F): 98.24 (05 Feb 2024 08:35), Max: 98.24 (05 Feb 2024 08:35)  HR: 78 (05 Feb 2024 09:24) (78 - 89)  BP: 135/73 (05 Feb 2024 09:24) (124/73 - 135/73)  BP(mean): --  RR: 17 (05 Feb 2024 08:35) (16 - 17)  SpO2: --    A and O x 3, in mild distress with lower back pain   Breathing comfortably on room air   Abdomen soft, gravid, non tender to palpation   No CVAT   SSE: no pooling, no blood in vault, no active bleeding, no bleeding on valsalva, cervix appears closed   TOCO; uterine irritability   TAS/TVUS: trasverse back down, M mode 164 bpm, MVP 5.1 cm, Cervical length 2.97-3.50 cm. SEDA fibroid 7.5 x 7.4cm. [facility]

## 2024-02-05 NOTE — OB PROVIDER TRIAGE NOTE - NSOBPROVIDERNOTE_OBGYN_ALL_OB_FT
This is a 27 y/o  at 21 W 6D who presents with light pink discharge this AM and lower back cramping. Pt with 7.5 x 7.4 SEDA fibroid.   Uterine irritability on toco. Cervical length appropriate. No bleeding on SSE. Urinalysis within normal limits.  Pain likely due to uterine fibroid. Pt declining tylenol     Apply heat pack for pain   Continue to observe   Discussed with Dr. Yanez This is a 27 y/o  at 21 W 6D who presents with light pink discharge this AM and lower back cramping. Pt with 7.5 x 7.4 SEDA fibroid.   Uterine irritability on toco. Cervical length appropriate. No bleeding on SSE. Urinalysis within normal limits.  Pain likely due to uterine fibroid. Pt declining tylenol     Apply heat pack for pain   Continue to observe   Discussed with Dr. Yanez    ADDENDUM 1008AM   Pt states heat pack has helped improved pain  OK to be discharged home.   Advised to use heat pack/tylenol for pain.   Discussed option of indocin per Dr Yanez however patient declined as she is not in a lot of pain.   Advised patient to return to triage for worsening symptoms, contractions, leaking fluid, vaginal bleeding.   Discharged home at 10:10AM

## 2024-02-05 NOTE — OB PROVIDER TRIAGE NOTE - NSHPLABSRESULTS_GEN_ALL_CORE
Urinalysis Basic - ( 2024 08:51 )    Color: Yellow / Appearance: Clear / S.019 / pH: x  Gluc: x / Ketone: Negative mg/dL  / Bili: Negative / Urobili: 1.0 mg/dL   Blood: x / Protein: Negative mg/dL / Nitrite: Negative   Leuk Esterase: Negative / RBC: x / WBC x   Sq Epi: x / Non Sq Epi: x / Bacteria: x

## 2024-02-07 DIAGNOSIS — O99.891 OTHER SPECIFIED DISEASES AND CONDITIONS COMPLICATING PREGNANCY: ICD-10-CM

## 2024-02-07 DIAGNOSIS — O34.82 MATERNAL CARE FOR OTHER ABNORMALITIES OF PELVIC ORGANS, SECOND TRIMESTER: ICD-10-CM

## 2024-02-07 DIAGNOSIS — R10.9 UNSPECIFIED ABDOMINAL PAIN: ICD-10-CM

## 2024-02-07 DIAGNOSIS — D25.1 INTRAMURAL LEIOMYOMA OF UTERUS: ICD-10-CM

## 2024-02-07 DIAGNOSIS — N89.8 OTHER SPECIFIED NONINFLAMMATORY DISORDERS OF VAGINA: ICD-10-CM

## 2024-02-07 DIAGNOSIS — M54.50 LOW BACK PAIN, UNSPECIFIED: ICD-10-CM

## 2024-02-07 DIAGNOSIS — Z3A.21 21 WEEKS GESTATION OF PREGNANCY: ICD-10-CM

## 2024-02-07 DIAGNOSIS — O26.892 OTHER SPECIFIED PREGNANCY RELATED CONDITIONS, SECOND TRIMESTER: ICD-10-CM

## 2024-03-22 ENCOUNTER — ASOB RESULT (OUTPATIENT)
Age: 29
End: 2024-03-22

## 2024-03-22 ENCOUNTER — APPOINTMENT (OUTPATIENT)
Dept: ANTEPARTUM | Facility: CLINIC | Age: 29
End: 2024-03-22
Payer: COMMERCIAL

## 2024-03-22 PROCEDURE — 76816 OB US FOLLOW-UP PER FETUS: CPT

## 2024-03-22 PROCEDURE — 76819 FETAL BIOPHYS PROFIL W/O NST: CPT | Mod: 59

## 2024-05-03 ENCOUNTER — APPOINTMENT (OUTPATIENT)
Dept: ANTEPARTUM | Facility: CLINIC | Age: 29
End: 2024-05-03
Payer: COMMERCIAL

## 2024-05-03 ENCOUNTER — ASOB RESULT (OUTPATIENT)
Age: 29
End: 2024-05-03

## 2024-05-03 PROCEDURE — 76819 FETAL BIOPHYS PROFIL W/O NST: CPT | Mod: 59

## 2024-05-03 PROCEDURE — 76816 OB US FOLLOW-UP PER FETUS: CPT

## 2024-06-03 ENCOUNTER — ASOB RESULT (OUTPATIENT)
Age: 29
End: 2024-06-03

## 2024-06-03 ENCOUNTER — APPOINTMENT (OUTPATIENT)
Dept: ANTEPARTUM | Facility: CLINIC | Age: 29
End: 2024-06-03
Payer: COMMERCIAL

## 2024-06-04 ENCOUNTER — INPATIENT (INPATIENT)
Facility: HOSPITAL | Age: 29
LOS: 1 days | Discharge: HOME | End: 2024-06-06
Attending: STUDENT IN AN ORGANIZED HEALTH CARE EDUCATION/TRAINING PROGRAM | Admitting: STUDENT IN AN ORGANIZED HEALTH CARE EDUCATION/TRAINING PROGRAM

## 2024-06-04 ENCOUNTER — TRANSCRIPTION ENCOUNTER (OUTPATIENT)
Age: 29
End: 2024-06-04

## 2024-06-04 VITALS
DIASTOLIC BLOOD PRESSURE: 82 MMHG | TEMPERATURE: 98 F | SYSTOLIC BLOOD PRESSURE: 132 MMHG | HEART RATE: 81 BPM | RESPIRATION RATE: 17 BRPM

## 2024-06-04 DIAGNOSIS — O26.899 OTHER SPECIFIED PREGNANCY RELATED CONDITIONS, UNSPECIFIED TRIMESTER: ICD-10-CM

## 2024-06-04 LAB
BASOPHILS # BLD AUTO: 0.04 K/UL — SIGNIFICANT CHANGE UP (ref 0–0.2)
BASOPHILS NFR BLD AUTO: 0.6 % — SIGNIFICANT CHANGE UP (ref 0–2)
BLD GP AB SCN SERPL QL: NEGATIVE — SIGNIFICANT CHANGE UP
EOSINOPHIL # BLD AUTO: 0.06 K/UL — SIGNIFICANT CHANGE UP (ref 0–0.5)
EOSINOPHIL NFR BLD AUTO: 0.9 % — SIGNIFICANT CHANGE UP (ref 0–6)
HCT VFR BLD CALC: 37.6 % — SIGNIFICANT CHANGE UP (ref 34.5–45)
HGB BLD-MCNC: 12.4 G/DL — SIGNIFICANT CHANGE UP (ref 11.5–15.5)
IANC: 4.41 K/UL — SIGNIFICANT CHANGE UP (ref 1.8–7.4)
IMM GRANULOCYTES NFR BLD AUTO: 1.5 % — HIGH (ref 0–0.9)
LYMPHOCYTES # BLD AUTO: 1.2 K/UL — SIGNIFICANT CHANGE UP (ref 1–3.3)
LYMPHOCYTES # BLD AUTO: 18.5 % — SIGNIFICANT CHANGE UP (ref 13–44)
MCHC RBC-ENTMCNC: 28.4 PG — SIGNIFICANT CHANGE UP (ref 27–34)
MCHC RBC-ENTMCNC: 33 GM/DL — SIGNIFICANT CHANGE UP (ref 32–36)
MCV RBC AUTO: 86.2 FL — SIGNIFICANT CHANGE UP (ref 80–100)
MONOCYTES # BLD AUTO: 0.69 K/UL — SIGNIFICANT CHANGE UP (ref 0–0.9)
MONOCYTES NFR BLD AUTO: 10.6 % — SIGNIFICANT CHANGE UP (ref 2–14)
NEUTROPHILS # BLD AUTO: 4.41 K/UL — SIGNIFICANT CHANGE UP (ref 1.8–7.4)
NEUTROPHILS NFR BLD AUTO: 67.9 % — SIGNIFICANT CHANGE UP (ref 43–77)
NRBC # BLD: 0 /100 WBCS — SIGNIFICANT CHANGE UP (ref 0–0)
NRBC # FLD: 0 K/UL — SIGNIFICANT CHANGE UP (ref 0–0)
PLATELET # BLD AUTO: 292 K/UL — SIGNIFICANT CHANGE UP (ref 150–400)
RBC # BLD: 4.36 M/UL — SIGNIFICANT CHANGE UP (ref 3.8–5.2)
RBC # FLD: 14.6 % — HIGH (ref 10.3–14.5)
RH IG SCN BLD-IMP: POSITIVE — SIGNIFICANT CHANGE UP
RH IG SCN BLD-IMP: POSITIVE — SIGNIFICANT CHANGE UP
T PALLIDUM AB TITR SER: NEGATIVE — SIGNIFICANT CHANGE UP
WBC # BLD: 6.5 K/UL — SIGNIFICANT CHANGE UP (ref 3.8–10.5)
WBC # FLD AUTO: 6.5 K/UL — SIGNIFICANT CHANGE UP (ref 3.8–10.5)

## 2024-06-04 PROCEDURE — 76819 FETAL BIOPHYS PROFIL W/O NST: CPT | Mod: 26

## 2024-06-04 PROCEDURE — 76815 OB US LIMITED FETUS(S): CPT | Mod: 26

## 2024-06-04 RX ORDER — OXYCODONE HYDROCHLORIDE 5 MG/1
5 TABLET ORAL
Refills: 0 | Status: DISCONTINUED | OUTPATIENT
Start: 2024-06-04 | End: 2024-06-06

## 2024-06-04 RX ORDER — BENZOCAINE 10 %
1 GEL (GRAM) MUCOUS MEMBRANE EVERY 6 HOURS
Refills: 0 | Status: DISCONTINUED | OUTPATIENT
Start: 2024-06-04 | End: 2024-06-06

## 2024-06-04 RX ORDER — IBUPROFEN 200 MG
600 TABLET ORAL EVERY 6 HOURS
Refills: 0 | Status: DISCONTINUED | OUTPATIENT
Start: 2024-06-04 | End: 2024-06-06

## 2024-06-04 RX ORDER — SODIUM CHLORIDE 9 MG/ML
3 INJECTION INTRAMUSCULAR; INTRAVENOUS; SUBCUTANEOUS EVERY 8 HOURS
Refills: 0 | Status: DISCONTINUED | OUTPATIENT
Start: 2024-06-04 | End: 2024-06-06

## 2024-06-04 RX ORDER — AER TRAVELER 0.5 G/1
1 SOLUTION RECTAL; TOPICAL EVERY 4 HOURS
Refills: 0 | Status: DISCONTINUED | OUTPATIENT
Start: 2024-06-04 | End: 2024-06-06

## 2024-06-04 RX ORDER — TETANUS TOXOID, REDUCED DIPHTHERIA TOXOID AND ACELLULAR PERTUSSIS VACCINE, ADSORBED 5; 2.5; 8; 8; 2.5 [IU]/.5ML; [IU]/.5ML; UG/.5ML; UG/.5ML; UG/.5ML
0.5 SUSPENSION INTRAMUSCULAR ONCE
Refills: 0 | Status: DISCONTINUED | OUTPATIENT
Start: 2024-06-04 | End: 2024-06-06

## 2024-06-04 RX ORDER — OXYTOCIN 10 UNIT/ML
41.67 VIAL (ML) INJECTION
Qty: 20 | Refills: 0 | Status: DISCONTINUED | OUTPATIENT
Start: 2024-06-04 | End: 2024-06-04

## 2024-06-04 RX ORDER — MAGNESIUM HYDROXIDE 400 MG/1
30 TABLET, CHEWABLE ORAL
Refills: 0 | Status: DISCONTINUED | OUTPATIENT
Start: 2024-06-04 | End: 2024-06-06

## 2024-06-04 RX ORDER — DIBUCAINE 1 %
1 OINTMENT (GRAM) RECTAL EVERY 6 HOURS
Refills: 0 | Status: DISCONTINUED | OUTPATIENT
Start: 2024-06-04 | End: 2024-06-06

## 2024-06-04 RX ORDER — CHLORHEXIDINE GLUCONATE 213 G/1000ML
1 SOLUTION TOPICAL DAILY
Refills: 0 | Status: DISCONTINUED | OUTPATIENT
Start: 2024-06-04 | End: 2024-06-04

## 2024-06-04 RX ORDER — DIPHENHYDRAMINE HCL 50 MG
25 CAPSULE ORAL EVERY 6 HOURS
Refills: 0 | Status: DISCONTINUED | OUTPATIENT
Start: 2024-06-04 | End: 2024-06-06

## 2024-06-04 RX ORDER — HYDROCORTISONE 1 %
1 OINTMENT (GRAM) TOPICAL EVERY 6 HOURS
Refills: 0 | Status: DISCONTINUED | OUTPATIENT
Start: 2024-06-04 | End: 2024-06-06

## 2024-06-04 RX ORDER — SIMETHICONE 80 MG/1
80 TABLET, CHEWABLE ORAL EVERY 4 HOURS
Refills: 0 | Status: DISCONTINUED | OUTPATIENT
Start: 2024-06-04 | End: 2024-06-06

## 2024-06-04 RX ORDER — IBUPROFEN 200 MG
1 TABLET ORAL
Qty: 0 | Refills: 0 | DISCHARGE
Start: 2024-06-04

## 2024-06-04 RX ORDER — ACETAMINOPHEN 500 MG
975 TABLET ORAL
Refills: 0 | Status: DISCONTINUED | OUTPATIENT
Start: 2024-06-04 | End: 2024-06-06

## 2024-06-04 RX ORDER — LANOLIN
1 OINTMENT (GRAM) TOPICAL EVERY 6 HOURS
Refills: 0 | Status: DISCONTINUED | OUTPATIENT
Start: 2024-06-04 | End: 2024-06-06

## 2024-06-04 RX ORDER — ACETAMINOPHEN 500 MG
3 TABLET ORAL
Qty: 0 | Refills: 0 | DISCHARGE
Start: 2024-06-04

## 2024-06-04 RX ORDER — ASPIRIN/CALCIUM CARB/MAGNESIUM 324 MG
1 TABLET ORAL
Refills: 0 | DISCHARGE

## 2024-06-04 RX ORDER — OXYCODONE HYDROCHLORIDE 5 MG/1
5 TABLET ORAL ONCE
Refills: 0 | Status: DISCONTINUED | OUTPATIENT
Start: 2024-06-04 | End: 2024-06-06

## 2024-06-04 RX ORDER — OXYTOCIN 10 UNIT/ML
333.33 VIAL (ML) INJECTION
Qty: 20 | Refills: 0 | Status: COMPLETED | OUTPATIENT
Start: 2024-06-04 | End: 2024-06-04

## 2024-06-04 RX ORDER — SODIUM CHLORIDE 9 MG/ML
1000 INJECTION, SOLUTION INTRAVENOUS
Refills: 0 | Status: DISCONTINUED | OUTPATIENT
Start: 2024-06-04 | End: 2024-06-04

## 2024-06-04 RX ORDER — OXYTOCIN 10 UNIT/ML
2 VIAL (ML) INJECTION
Qty: 30 | Refills: 0 | Status: DISCONTINUED | OUTPATIENT
Start: 2024-06-04 | End: 2024-06-04

## 2024-06-04 RX ORDER — KETOROLAC TROMETHAMINE 30 MG/ML
30 SYRINGE (ML) INJECTION ONCE
Refills: 0 | Status: DISCONTINUED | OUTPATIENT
Start: 2024-06-04 | End: 2024-06-04

## 2024-06-04 RX ORDER — IBUPROFEN 200 MG
600 TABLET ORAL EVERY 6 HOURS
Refills: 0 | Status: COMPLETED | OUTPATIENT
Start: 2024-06-04 | End: 2025-05-03

## 2024-06-04 RX ORDER — PRAMOXINE HYDROCHLORIDE 150 MG/15G
1 AEROSOL, FOAM RECTAL EVERY 4 HOURS
Refills: 0 | Status: DISCONTINUED | OUTPATIENT
Start: 2024-06-04 | End: 2024-06-06

## 2024-06-04 RX ADMIN — SODIUM CHLORIDE 125 MILLILITER(S): 9 INJECTION, SOLUTION INTRAVENOUS at 06:41

## 2024-06-04 RX ADMIN — Medication 975 MILLIGRAM(S): at 21:32

## 2024-06-04 RX ADMIN — CHLORHEXIDINE GLUCONATE 1 APPLICATION(S): 213 SOLUTION TOPICAL at 06:40

## 2024-06-04 RX ADMIN — Medication 975 MILLIGRAM(S): at 21:02

## 2024-06-04 RX ADMIN — SODIUM CHLORIDE 3 MILLILITER(S): 9 INJECTION INTRAMUSCULAR; INTRAVENOUS; SUBCUTANEOUS at 16:46

## 2024-06-04 RX ADMIN — Medication 30 MILLIGRAM(S): at 14:23

## 2024-06-04 RX ADMIN — Medication 2 MILLIUNIT(S)/MIN: at 04:57

## 2024-06-04 RX ADMIN — Medication 1000 MILLIUNIT(S)/MIN: at 13:39

## 2024-06-04 RX ADMIN — Medication 600 MILLIGRAM(S): at 23:26

## 2024-06-04 RX ADMIN — Medication 30 MILLIGRAM(S): at 15:30

## 2024-06-04 NOTE — OB PROVIDER H&P - NSHPPHYSICALEXAM_GEN_ALL_CORE
T(C): 36.7 (06-04-24 @ 01:58), Max: 36.7 (06-04-24 @ 01:36)  HR: 85 (06-04-24 @ 02:00) (81 - 85)  BP: 127/81 (06-04-24 @ 02:00) (127/81 - 132/82)  RR: 17 (06-04-24 @ 01:36) (17 - 17)    General: Female sitting comfortably    Neuro: No facial asymmetry, no slurred speech, moves all 4 extremities  Mood: Alert and lucid, appropriate mood and affect  Head: Normocephalic. Atraumatic.   Eyes: No discharge, lids normal, conjunctiva normal  Lungs: No respiratory distress  Abdomen: Soft, nontender. Gravid.   TAUS:  cephalic presentation, posterior placenta, mmode 152, MVP 4.57. Sono saved in ASOB.   NST: 140/mod/+accel/no decels. reactive  CTX: 4-6 mins  SSE: No erythema, edema, lesions to external genitalia. Physiologic discharge present. No active, brisk bleeding.  Positive pooling. Positve Nitrazine. Positive Fern.   SVE: 2/70/-3  EFW: 3400g

## 2024-06-04 NOTE — OB PROVIDER TRIAGE NOTE - NSHPPHYSICALEXAM_GEN_ALL_CORE
T(C): 36.7 (06-04-24 @ 01:58), Max: 36.7 (06-04-24 @ 01:36)  HR: 85 (06-04-24 @ 02:00) (81 - 85)  BP: 127/81 (06-04-24 @ 02:00) (127/81 - 132/82)  RR: 17 (06-04-24 @ 01:36) (17 - 17)    General: Female sitting comfortably    Neuro: No facial asymmetry, no slurred speech, moves all 4 extremities  Mood: Alert and lucid, appropriate mood and affect  Head: Normocephalic. Atraumatic.   Eyes: No discharge, lids normal, conjunctiva normal  Lungs: No respiratory distress  Abdomen: Soft, nontender. Gravid.   TAUS:  Sono saved in ASOB.   NST:   SSE: No erythema, edema, lesions to external genitalia. Physiologic discharge present. No active, brisk bleeding.  Negative pooling. Negative Nitrazine. Negative Fern.   SVE: No fluid seen. White physiologic discharge on gloves.   EFW: T(C): 36.7 (06-04-24 @ 01:58), Max: 36.7 (06-04-24 @ 01:36)  HR: 85 (06-04-24 @ 02:00) (81 - 85)  BP: 127/81 (06-04-24 @ 02:00) (127/81 - 132/82)  RR: 17 (06-04-24 @ 01:36) (17 - 17)    General: Female sitting comfortably    Neuro: No facial asymmetry, no slurred speech, moves all 4 extremities  Mood: Alert and lucid, appropriate mood and affect  Head: Normocephalic. Atraumatic.   Eyes: No discharge, lids normal, conjunctiva normal  Lungs: No respiratory distress  Abdomen: Soft, nontender. Gravid.   TAUS:  cephalic presentation, posterior placenta, mmode 152, MVP 4.57. Sono saved in ASOB.   NST: 140/mod/+accel/no decels. reactive  CTX: 4-6 mins  SSE: No erythema, edema, lesions to external genitalia. Physiologic discharge present. No active, brisk bleeding.  Positive pooling. Positve Nitrazine. Positive Fern.   SVE: 2/70/-3  EFW: 3400g

## 2024-06-04 NOTE — OB PROVIDER LABOR PROGRESS NOTE - NS_SUBJECTIVE/OBJECTIVE_OBGYN_ALL_OB_FT
Pt seen and examined for increased pain
Labor & Delivery Progress Note     Pt seen & examined at bedside for complaints of rectal pressure.     T(C): 36.8 (06-04-24 @ 07:57), Max: 36.8 (06-04-24 @ 06:00)  HR: 98 (06-04-24 @ 11:25) (64 - 106)  BP: 163/86 (06-04-24 @ 11:12) (102/59 - 163/86)  RR: 17 (06-04-24 @ 07:57) (16 - 17)  SpO2: 93% (06-04-24 @ 11:25) (86% - 100%)
R1 Labor & Delivery Progress Note       Pt seen & examined at bedside for labor progress assessment. Pt comfortable with epidural.          T(C): 36.7 (06-04-24 @ 03:39), Max: 36.7 (06-04-24 @ 01:36)  HR: 89 (06-04-24 @ 03:42) (81 - 103)  BP: 141/87 (06-04-24 @ 03:41) (127/81 - 141/87)  RR: 16 (06-04-24 @ 03:39) (16 - 17)  SpO2: 93% (06-04-24 @ 03:42) (90% - 100%)

## 2024-06-04 NOTE — OB PROVIDER LABOR PROGRESS NOTE - NS_OBIHIFHRDETAILS_OBGYN_ALL_OB_FT
Baseline 140, mod variability, - accels, - decels
EFM: 140s/mod. variability/+accels/-decels
EFM: 145/mod. variability/+accels/-decels  [place]: q1-3 min

## 2024-06-04 NOTE — DISCHARGE NOTE OB - PLAN OF CARE
f/u in 6wks After discharge, please stay on pelvic rest for 6 weeks, meaning no sexual intercourse, no tampons and no douching.  No driving for 2 weeks.  No lifting objects heavier than baby for 2 weeks.  Expect to have vaginal bleeding/spotting for up to six weeks.  The bleeding should get lighter and more white/light brown with time.  For bleeding soaking more than a pad an hour or passing clots greater than the size of your fist, come in to the   emergency department.  Follow up in the office in 6 weeks

## 2024-06-04 NOTE — DISCHARGE NOTE OB - PATIENT PORTAL LINK FT
You can access the FollowMyHealth Patient Portal offered by [facility] by registering at the following website: [email or web address]. By joining [employer]’s FollowMyHealth portal, you will also be able to view your health information using other applications (apps) compatible with our system.

## 2024-06-04 NOTE — OB PROVIDER DELIVERY SUMMARY - NSPROVIDERDELIVERYNOTE_OBGYN_ALL_OB_FT
Pt became fd and pushed to deliver a liveborn male 7lb7oz over 2nd degree perineal laceration  Placenta delivered spontanously with 3vc  Laceration repaired with 2-0 chromic  Rectal cytotec given prophylactically

## 2024-06-04 NOTE — OB RN TRIAGE NOTE - NSICDXPASTMEDICALHX_GEN_ALL_CORE_FT
PAST MEDICAL HISTORY:  No pertinent past medical history      PAST MEDICAL HISTORY:  Uterine fibroid

## 2024-06-04 NOTE — OB PROVIDER H&P - ASSESSMENT
28 y/o  EDC 24 EGA 39.0 admitted for SROM.    - Discussed with Dr Chase.  - Admit to Labor and Delivery  - Continuous EFM  - Clear diet, IV fluids  - Blood consent signed  - Standard labs (T&S, CBC, RPR)  - GBS negative   - Epidural  - Induction/augmentation agent: Pitocin    Risks, benefits, alternatives, and possible complications have been discussed in detail with the patient in her native language. Pre-admission, admission, and post admission procedures and expectations were discussed in detail. All questions answered, all appropriate hospital consents were signed. Anticipate normal vaginal delivery.   Informed consent was obtained. The following was discussed:   - Induction/augmentation of labor: use of medication and/or cook balloon to begin or enhance labor   - Obstetrical management including internal fetal/contraction monitoring   - Normal vaginal delivery   - Possible  section      30 y/o  EDC 24 EGA 39.0 admitted for SROM.    - Discussed with Dr Chase and P[facility] 3 Dr Erickson.   - Admit to Labor and Delivery  - Continuous EFM  - Clear diet, IV fluids  - Blood consent signed  - Standard labs (T&S, CBC, RPR)  - GBS negative   - 2 units on hold  - Epidural  - Induction/augmentation agent: Pitocin    Risks, benefits, alternatives, and possible complications have been discussed in detail with the patient in her native language. Pre-admission, admission, and post admission procedures and expectations were discussed in detail. All questions answered, all appropriate hospital consents were signed. Anticipate normal vaginal delivery.   Informed consent was obtained. The following was discussed:   - Induction/augmentation of labor: use of medication and/or cook balloon to begin or enhance labor   - Obstetrical management including internal fetal/contraction monitoring   - Normal vaginal delivery   - Possible  section

## 2024-06-04 NOTE — OB RN PATIENT PROFILE - FALL HARM RISK - UNIVERSAL INTERVENTIONS
Bed in lowest position, wheels locked, appropriate side rails in place/Call bell, personal items and telephone in reach/Instruct patient to call for assistance before getting out of bed or chair/Non-slip footwear when patient is out of bed/[place] to call system/Physically safe environment - no spills, clutter or unnecessary equipment/Purposeful Proactive Rounding/Room/bathroom lighting operational, light cord in reach

## 2024-06-04 NOTE — DISCHARGE NOTE OB - CARE PLAN
Assessment and plan of treatment:	f/u in 6wks   Principal Discharge DX:	 (normal spontaneous vaginal delivery)  Assessment and plan of treatment:	After discharge, please stay on pelvic rest for 6 weeks, meaning no sexual intercourse, no tampons and no douching.  No driving for 2 weeks.  No lifting objects heavier than baby for 2 weeks.  Expect to have vaginal bleeding/spotting for up to six weeks.  The bleeding should get lighter and more white/light brown with time.  For bleeding soaking more than a pad an hour or passing clots greater than the size of your fist, come in to the   emergency department.  Follow up in the office in 6 weeks   1

## 2024-06-04 NOTE — DISCHARGE NOTE OB - MEDICATION SUMMARY - MEDICATIONS TO TAKE
I will START or STAY ON the medications listed below when I get home from the hospital:    ibuprofen 600 mg oral tablet  -- 1 tab(s) by mouth every 6 hours  -- Indication: For pain    acetaminophen 325 mg oral tablet  -- 3 tab(s) by mouth every 8 hours as needed for  moderate pain  -- Indication: For pain   I will START or STAY ON the medications listed below when I get home from the hospital:    ibuprofen 600 mg oral tablet  -- 1 tab(s) by mouth every 6 hours as needed for  moderate pain  -- Indication: For moderate pain    acetaminophen 325 mg oral tablet  -- 3 tab(s) by mouth every 8 hours as needed for  moderate pain  -- Indication: For mild pain

## 2024-06-04 NOTE — OB RN DELIVERY SUMMARY - NS_SEPSISRSKCALC_OBGYN_ALL_OB_FT
EOS calculated successfully. EOS Risk Factor: 0.5/1000 live births ([facility] national incidence); GA=39w;Temp=98.24; ROM=13.517; GBS='Negative'; Antibiotics='No antibiotics or any antibiotics < 2 hrs prior to birth'

## 2024-06-04 NOTE — OB PROVIDER TRIAGE NOTE - HISTORY OF PRESENT ILLNESS
PNC: Dr Orellana    30 y/o  EDC 24 EGA 39.0 present with c/o leaking fluid since midnight.           At time of HPI pt reports fetal movement denies vaginal bleeding cramping/contractions.    Antepartum History:  -Fibroids x1   -GBS negative (2024)    MFM sono 5/3: Cephalic presentation, posterior placenta, ANNA WNL, BPP 8/8, EFW 2468g PNC: Dr Orellana    28 y/o  EDC 24 EGA 39.0 present with c/o leaking fluid since midnight. Pt states has been having contractions since yesterday and around midnight she broke her water and It was brown in color. Pt rates her pain as 6/10 and is requesting pain management. At time of HPI pt reports fetal movement and denies vaginal bleeding.    Antepartum History:  -Fibroids x1   5/3 Myomas   Site                 L(cm)     W(cm)     D(cm)     Location   Left Lateral         8.45      6.72      8    -GBS negative (2024)  - daily ASA use (last used 6/3 @1130pm)    MFM sono 5/3: Cephalic presentation, posterior placenta, ANNA WNL, BPP , EFW 2468g

## 2024-06-04 NOTE — OB PROVIDER LABOR PROGRESS NOTE - ASSESSMENT
Plan:  29y P0  IOL for PROM, making adequate cervical change   - C/w pitocin   - peanut ball   - anticipate     D/w Dr. Cesia Shah PGY-1
29y G1 @39w admitted for IOL    - Continue cont EFM, toco, IVF  - Continue pitocin for IOL    D/w Dr. Cesia Mata PGY1
    Plan: 29y y/o @ 39w in stable condition  - Con't IOL, to start pitocin  - Continuous EFM, [place]  - Con't IVF      Elena Mccray MD, PGY1

## 2024-06-04 NOTE — OB PROVIDER TRIAGE NOTE - NSOBPROVIDERNOTE_OBGYN_ALL_OB_FT
28 y/o  EDC 24 EGA 39.0 admitted for SROM.    - Discussed with Dr Chase.  - Admit to Labor and Delivery  - Continuous EFM  - Clear diet, IV fluids  - Blood consent signed  - Standard labs (T&S, CBC, RPR)  - GBS negative   - Epidural  - Induction/augmentation agent: Pitocin    Risks, benefits, alternatives, and possible complications have been discussed in detail with the patient in her native language. Pre-admission, admission, and post admission procedures and expectations were discussed in detail. All questions answered, all appropriate hospital consents were signed. Anticipate normal vaginal delivery.   Informed consent was obtained. The following was discussed:   - Induction/augmentation of labor: use of medication and/or cook balloon to begin or enhance labor   - Obstetrical management including internal fetal/contraction monitoring   - Normal vaginal delivery   - Possible  section

## 2024-06-04 NOTE — DISCHARGE NOTE OB - CARE PROVIDER_API CALL
Kateryna Chase  Obstetrics and Gynecology  [address], [address]  [place] [address]  Phone: [phone]  Fax: [phone]  Established Patient  Follow Up Time: Routine

## 2024-06-04 NOTE — OB PROVIDER H&P - HISTORY OF PRESENT ILLNESS
PNC: Dr Orellana    28 y/o  EDC 24 EGA 39.0 present with c/o leaking fluid since midnight. Pt states has been having contractions since yesterday and around midnight she broke her water and It was brown in color. Pt rates her pain as 6/10 and is requesting pain management. At time of HPI pt reports fetal movement and denies vaginal bleeding.    Antepartum History:  -Fibroids x1   5/3 Myomas   Site                 L(cm)     W(cm)     D(cm)     Location   Left Lateral         8.45      6.72      8    -GBS negative (2024)  - daily ASA use (last used 6/3 @1130pm)    MFM sono 5/3: Cephalic presentation, posterior placenta, ANAN WNL, BPP , EFW 2468g

## 2024-06-04 NOTE — OB NEONATOLOGY/PEDIATRICIAN DELIVERY SUMMARY - NSPEDSNEONOTESA_OBGYN_ALL_OB_FT
Peds called to delivery for meconium at delivery. 39wk AGA male born via  to a 30y/o  mother. Mother admitted for SROM. Maternal ob/gyn hx of fibroid removal. No other significant maternal history. Maternal labs include Blood Type B+ , HIV - , RPR NR , Rubella I , Hep B - , GBS - . SROM at midnight on  with meconium fluids (ROM hours: 13H 31M). Baby emerged vigorous, crying, was w/d/s/s with APGARS of 8/9. Mom plans to initiate breastfeeding, declines Hep B vaccine and consents circ.  Highest maternal temp: 36.8. EOS 0.11. Admitted to  nursery.    Physical Exam:  Gen: no acute distress, +grimace  HEENT:  anterior fontanel open soft and flat, nondysmorphic facies, no cleft lip/palate, ears normal set, no ear pits or tags, nares clinically patent  Resp: Normal respiratory effort without grunting or retractions, good air entry b/l, clear to auscultation bilaterally  Cardio: Present S1/S2, regular rate and rhythm, no murmurs  Abd: soft, non tender, non distended, umbilical cord with 3 vessels  Neuro: +palmar and plantar grasp, +suck, +alan, normal tone  Extremities: negative staton and ortolani maneuvers, moving all extremities, no clavicular crepitus or stepoff  Skin: pink, warm  Genitals: Normal male anatomy, testicles palpable in scrotum b/l, Bandar 1, anus patent

## 2024-06-04 NOTE — OB RN PATIENT PROFILE - EXTENSIONS OF SELF_ADULT
I spoke with [facility] and she states that she is going to follow up with his PCP. She also states that they changed insurance and Joey Veloz will no longer be able to see Dr Elmo Hoang. I asked her to call back if anything changes.
None

## 2024-06-05 LAB
HCT VFR BLD CALC: 29.8 % — LOW (ref 34.5–45)
HGB BLD-MCNC: 9.7 G/DL — LOW (ref 11.5–15.5)

## 2024-06-05 RX ORDER — ASCORBIC ACID 60 MG
500 TABLET,CHEWABLE ORAL DAILY
Refills: 0 | Status: DISCONTINUED | OUTPATIENT
Start: 2024-06-05 | End: 2024-06-06

## 2024-06-05 RX ORDER — SENNA PLUS 8.6 MG/1
1 TABLET ORAL
Refills: 0 | Status: DISCONTINUED | OUTPATIENT
Start: 2024-06-05 | End: 2024-06-06

## 2024-06-05 RX ORDER — FERROUS SULFATE 325(65) MG
325 TABLET ORAL
Refills: 0 | Status: DISCONTINUED | OUTPATIENT
Start: 2024-06-05 | End: 2024-06-06

## 2024-06-05 RX ADMIN — Medication 975 MILLIGRAM(S): at 10:45

## 2024-06-05 RX ADMIN — Medication 600 MILLIGRAM(S): at 18:35

## 2024-06-05 RX ADMIN — Medication 975 MILLIGRAM(S): at 09:39

## 2024-06-05 RX ADMIN — Medication 325 MILLIGRAM(S): at 18:34

## 2024-06-05 RX ADMIN — Medication 975 MILLIGRAM(S): at 16:04

## 2024-06-05 RX ADMIN — Medication 600 MILLIGRAM(S): at 19:05

## 2024-06-05 RX ADMIN — Medication 975 MILLIGRAM(S): at 17:20

## 2024-06-05 RX ADMIN — Medication 975 MILLIGRAM(S): at 22:32

## 2024-06-05 RX ADMIN — Medication 600 MILLIGRAM(S): at 13:55

## 2024-06-05 RX ADMIN — SENNA PLUS 1 TABLET(S): 8.6 TABLET ORAL at 18:34

## 2024-06-05 RX ADMIN — Medication 975 MILLIGRAM(S): at 03:33

## 2024-06-05 RX ADMIN — Medication 1 TABLET(S): at 12:49

## 2024-06-05 RX ADMIN — Medication 600 MILLIGRAM(S): at 00:00

## 2024-06-05 RX ADMIN — Medication 975 MILLIGRAM(S): at 04:03

## 2024-06-05 RX ADMIN — Medication 600 MILLIGRAM(S): at 12:48

## 2024-06-05 RX ADMIN — SODIUM CHLORIDE 3 MILLILITER(S): 9 INJECTION INTRAMUSCULAR; INTRAVENOUS; SUBCUTANEOUS at 13:09

## 2024-06-05 RX ADMIN — Medication 975 MILLIGRAM(S): at 22:02

## 2024-06-05 RX ADMIN — Medication 500 MILLIGRAM(S): at 12:49

## 2024-06-05 NOTE — LACTATION INITIAL EVALUATION - INTERVENTION OUTCOME
Assisted with deep latch and positioning in football and cross cradle. Infant latched on in football on the right side with a wide gape and a RS with a SSB pattern. Patient educated about infants less than 24h of age being sleepy. Patient was made aware of cluster feeding that occurs after 24h of life and to be cautious of sleep deprivation. In order to maintain infant and patient safety, patient was instructed to place infant in bassinet or call for assistance as needed. Guide to postpartum and  care book was reviewed. Patient was educated on the nutritional needs of the baby and how many wet and dirty diapers to expect. Recognition of feeding cues and to feed the baby on demand, based on cues,  and at least 8-12 times in a day was discussed. Instructed patient to wake the baby to feed if no feeding cues are seen within 3h since prior feed.  Use of feeding log to record feedings along with wet and dirty diapers was encouraged. Instructed in hand expression with good return demonstration.  Reviewed safe skin to skin. Patient verbalized understanding of  information and education given. All questions and concerns were answered./verbalizes understanding/good return demonstration

## 2024-06-05 NOTE — LACTATION INITIAL EVALUATION - LACTATION INTERVENTIONS
initiate/review safe skin-to-skin/initiate/review hand expression/initiate/review pumping guidelines and safe milk handling/initiate/review techniques for position and latch/review techniques to increase milk supply/review techniques to manage sore nipples/engorgement/initiate/review breast massage/compression/reviewed components of an effective feeding and at least 8 effective feedings per day required/reviewed importance of monitoring infant diapers, the breastfeeding log, and minimum output each day/reviewed risks of unnecessary formula supplementation/reviewed risks of artificial nipples/reviewed strategies to transition to breastfeeding only/reviewed benefits and recommendations for rooming in/reviewed feeding on demand/by cue at least 8 times a day/recommended follow-up with pediatrician within 24 hours of discharge

## 2024-06-05 NOTE — PROGRESS NOTE ADULT - SUBJECTIVE AND OBJECTIVE BOX
S: Patient doing well.   Pain controlled.  +OOB.  +void.  Tolerating PO.  Lochia WNL.    O: Vital Signs Last 24 Hrs  T(C): 36.7 (2024 05:15), Max: 36.7 (2024 10:00)  T(F): 98.1 (2024 05:15), Max: 98.1 (2024 05:15)  HR: 76 (2024 05:15) (64 - 130)  BP: 116/69 (2024 05:15) (105/63 - 170/72)  BP(mean): --  RR: 18 (2024 05:15) (16 - 18)  SpO2: 100% (2024 05:15) (85% - 100%)    Parameters below as of 2024 05:15  Patient On (Oxygen Delivery Method): room air        Gen: NAD  Abd: soft, NT, ND.   fundus firm below umbilicus, NT.  Ext: no tenderness B/L, negative Joseluis's sign    Labs:                        9.7    x     )-----------( x        ( 2024 05:05 )             29.8       ABO Interpretation: B ( @ 02:52)    Rh Interpretation: Positive ( @ 02:52)    Antibody Screen Negative            A: 29y PPD#1 s/p  doing well.   - asymptomatic of acute blood loss anemia.   -Continue routine postpartum care.  -Discharge planning.     MD Cole

## 2024-06-06 VITALS
DIASTOLIC BLOOD PRESSURE: 61 MMHG | SYSTOLIC BLOOD PRESSURE: 109 MMHG | TEMPERATURE: 98 F | OXYGEN SATURATION: 100 % | RESPIRATION RATE: 18 BRPM | HEART RATE: 81 BPM

## 2024-06-06 RX ADMIN — Medication 975 MILLIGRAM(S): at 03:51

## 2024-06-06 RX ADMIN — Medication 600 MILLIGRAM(S): at 01:12

## 2024-06-06 RX ADMIN — Medication 600 MILLIGRAM(S): at 00:42

## 2024-06-06 RX ADMIN — Medication 975 MILLIGRAM(S): at 04:23

## 2024-06-06 RX ADMIN — Medication 600 MILLIGRAM(S): at 06:31

## 2025-03-11 NOTE — ED ADULT TRIAGE NOTE - WEIGHT METHOD
Name of Medication(s) Requested:  Requested Prescriptions     Pending Prescriptions Disp Refills    metoprolol succinate (TOPROL XL) 25 MG extended release tablet 45 tablet 3     Sig: Take 0.5 tablets by mouth daily       Medication is on current medication list Yes    Dosage and directions were verified? Yes    Quantity verified: 90 day supply     Pharmacy Verified?  Yes    Last Appointment:  10/18/2024    Future appts:  No future appointments.     (If no appt send self scheduling link. .[email or web address])  Scheduling request sent?     [] Yes  [x] No    Does patient need updated?  [] Yes  [x] No  
stated